# Patient Record
Sex: FEMALE | Race: WHITE | Employment: FULL TIME | URBAN - METROPOLITAN AREA
[De-identification: names, ages, dates, MRNs, and addresses within clinical notes are randomized per-mention and may not be internally consistent; named-entity substitution may affect disease eponyms.]

---

## 2017-07-12 ENCOUNTER — HOSPITAL ENCOUNTER (OUTPATIENT)
Age: 27
Setting detail: SPECIMEN
Discharge: HOME | End: 2017-07-12
Payer: MEDICARE

## 2017-07-12 ENCOUNTER — OFFICE VISIT (OUTPATIENT)
Dept: OBGYN CLINIC | Age: 27
End: 2017-07-12
Payer: MEDICARE

## 2017-07-12 VITALS
DIASTOLIC BLOOD PRESSURE: 69 MMHG | HEART RATE: 68 BPM | WEIGHT: 146 LBS | SYSTOLIC BLOOD PRESSURE: 104 MMHG | BODY MASS INDEX: 24.92 KG/M2 | HEIGHT: 64 IN

## 2017-07-12 DIAGNOSIS — Z01.419 WOMEN'S ANNUAL ROUTINE GYNECOLOGICAL EXAMINATION: Primary | ICD-10-CM

## 2017-07-12 PROCEDURE — 99395 PREV VISIT EST AGE 18-39: CPT | Performed by: ADVANCED PRACTICE MIDWIFE

## 2017-07-12 ASSESSMENT — ENCOUNTER SYMPTOMS
ALLERGIC/IMMUNOLOGIC NEGATIVE: 1
RESPIRATORY NEGATIVE: 1
EYES NEGATIVE: 1
GASTROINTESTINAL NEGATIVE: 1

## 2017-07-17 LAB — CYTOLOGY REPORT: NORMAL

## 2017-11-06 ENCOUNTER — HOSPITAL ENCOUNTER (OUTPATIENT)
Age: 27
Setting detail: SPECIMEN
Discharge: HOME | End: 2017-11-06
Payer: MEDICARE

## 2017-11-06 ENCOUNTER — OFFICE VISIT (OUTPATIENT)
Dept: OBGYN CLINIC | Age: 27
End: 2017-11-06
Payer: MEDICARE

## 2017-11-06 VITALS
BODY MASS INDEX: 23.56 KG/M2 | HEART RATE: 76 BPM | SYSTOLIC BLOOD PRESSURE: 102 MMHG | WEIGHT: 138 LBS | HEIGHT: 64 IN | DIASTOLIC BLOOD PRESSURE: 66 MMHG

## 2017-11-06 DIAGNOSIS — N93.9 ABNORMAL UTERINE BLEEDING (AUB): ICD-10-CM

## 2017-11-06 DIAGNOSIS — N93.9 ABNORMAL UTERINE BLEEDING (AUB): Primary | ICD-10-CM

## 2017-11-06 LAB
ABSOLUTE EOS #: 0.09 K/UL (ref 0–0.44)
ABSOLUTE IMMATURE GRANULOCYTE: <0.03 K/UL (ref 0–0.3)
ABSOLUTE LYMPH #: 1.39 K/UL (ref 1.1–3.7)
ABSOLUTE MONO #: 0.39 K/UL (ref 0.1–1.2)
BASOPHILS # BLD: 1 % (ref 0–2)
BASOPHILS ABSOLUTE: 0.05 K/UL (ref 0–0.2)
DIFFERENTIAL TYPE: ABNORMAL
EOSINOPHILS RELATIVE PERCENT: 2 % (ref 1–4)
HCG QUANTITATIVE: <1 IU/L
HCT VFR BLD CALC: 39.8 % (ref 36.3–47.1)
HEMOGLOBIN: 12.5 G/DL (ref 11.9–15.1)
IMMATURE GRANULOCYTES: 0 %
LYMPHOCYTES # BLD: 23 % (ref 24–43)
MCH RBC QN AUTO: 26.9 PG (ref 25.2–33.5)
MCHC RBC AUTO-ENTMCNC: 31.4 G/DL (ref 28.4–34.8)
MCV RBC AUTO: 85.6 FL (ref 82.6–102.9)
MONOCYTES # BLD: 7 % (ref 3–12)
PDW BLD-RTO: 13 % (ref 11.8–14.4)
PLATELET # BLD: 251 K/UL (ref 138–453)
PLATELET ESTIMATE: ABNORMAL
PMV BLD AUTO: 10.8 FL (ref 8.1–13.5)
RBC # BLD: 4.65 M/UL (ref 3.95–5.11)
RBC # BLD: ABNORMAL 10*6/UL
SEG NEUTROPHILS: 68 % (ref 36–65)
SEGMENTED NEUTROPHILS ABSOLUTE COUNT: 4.03 K/UL (ref 1.5–8.1)
THYROXINE, FREE: 1.29 NG/DL (ref 0.93–1.7)
TSH SERPL DL<=0.05 MIU/L-ACNC: 0.55 MIU/L (ref 0.3–5)
WBC # BLD: 6 K/UL (ref 3.5–11.3)
WBC # BLD: ABNORMAL 10*3/UL

## 2017-11-06 PROCEDURE — G8427 DOCREV CUR MEDS BY ELIG CLIN: HCPCS | Performed by: ADVANCED PRACTICE MIDWIFE

## 2017-11-06 PROCEDURE — G8484 FLU IMMUNIZE NO ADMIN: HCPCS | Performed by: ADVANCED PRACTICE MIDWIFE

## 2017-11-06 PROCEDURE — G8420 CALC BMI NORM PARAMETERS: HCPCS | Performed by: ADVANCED PRACTICE MIDWIFE

## 2017-11-06 PROCEDURE — 99213 OFFICE O/P EST LOW 20 MIN: CPT | Performed by: ADVANCED PRACTICE MIDWIFE

## 2017-11-06 PROCEDURE — 1036F TOBACCO NON-USER: CPT | Performed by: ADVANCED PRACTICE MIDWIFE

## 2017-11-06 RX ORDER — M-VIT,TX,IRON,MINS/CALC/FOLIC 27MG-0.4MG
1 TABLET ORAL DAILY
COMMUNITY
End: 2019-02-06

## 2017-11-06 ASSESSMENT — PATIENT HEALTH QUESTIONNAIRE - PHQ9
1. LITTLE INTEREST OR PLEASURE IN DOING THINGS: 0
SUM OF ALL RESPONSES TO PHQ9 QUESTIONS 1 & 2: 0
SUM OF ALL RESPONSES TO PHQ QUESTIONS 1-9: 0
2. FEELING DOWN, DEPRESSED OR HOPELESS: 0

## 2017-11-06 NOTE — PROGRESS NOTES
SUBJECTIVE:     CC:   Chief Complaint   Patient presents with    Menstrual Problem     periods have been heavy          HPI:   Anthony Philippe is an established patient with our practice and presents today with a complaint of abnormal vaginal bleeding. Pt reports having a spontaneous vaginal delivery in may of 2016 with exclusive breastfeeding and not having a resumption of menses until July of 2017. Pt reports prior to pregnancy having sporadic menses with 7 days in length and the first two days heavy. Reports not having a regular cycle since July and having an increase in menstrual flow. Reports when using tampons having to change it within the hour. Reports changing to menstrual cup which holds 30cc of blood and having to change after an hour and half. Reports is concerned if this is normal. Reports is still breastfeeding. Denies any recent weight gain/loss, no increased acne, or hirsutism. Reports researching chaste berry and wondering safety in lactation. She denies molimina. She denies any recent trauma. She is  sexually active and may be pregnant  She  denies any bleeding disorder in her family history. Associated symptoms: menstrual cramping     Patient's last menstrual period was 11/01/2017. .    . Anthony Philippe states there is   associated pain. She rates the pain 6/10 on the scale. Anthony Philippe denies any recent changes in her health, weight, medications, stressors or exercise level. REVIEW OF SYSTEMS:    1. Constitutional: No fever, chills or malaise. No weight change or fatigue  2. Head and eyes: No headache or trauma. No visual changes or dizziness  3. ENT: No hearing loss, tinnitus, sinus or taste problems. No heat or cold intolerance  4. Hematologic: No lymphoma, Von Willebrand's, Hemophilia or bruising,   5. Psychological: No depression, suicidal thoughts, crying  or anxiety  6. Breast: No skin changes, masses, mastalgia, discharge Currently Breast feeding  7.  Respiratory:  No cough, wheezing, SOB  8. Cardiovascular: No  palpitations, syncope, edema or chest pain  9. Gastrointestinal:  No heartburn, N/V, bloody stools, pain, bloating  10. Genito-urinary: No dysuria, hematuria, dyspareunia, + abnormal bleeding  11. Musculoskeletal: No arthralgia, gout, muscle weakness  12. Neurological: No CVA, migraines, seizures, syncope, numbness  13. Dermatologic: No rashes, itching, hives, acne or hirsutism  14. Lymphatic: No adenopathy    OBJECTIVE:    PHYSICAL EXAM:  Constitutional:   Blood pressure 102/66, pulse 76, height 5' 4\" (1.626 m), weight 138 lb (62.6 kg), last menstrual period 11/01/2017, currently breastfeeding. General Appearance: This is a well-developed, well-nourished and well-groomed female    Skin:  Inspection of the skin revealed no rashes or lesions    Neck and EENT:  The neck was supple with full range of motion and no masses. The thyroid was not enlarged and had no masses. Normal external ears are present  Nares are patent    Respiratory: The lungs were clear to auscultation bilaterally. There were no rales, rhonchi or wheezes. There was good respiratory effort. Cardiovascular: The heart was in a regular rhythm and rate was normal.  No murmur or extra sounds were noted. Breast:  deferred    Back:  Straight with no CVA tenderness present    Abdomen: The abdomen is soft and non-tender. There was no guarding, rebound or rigidity. The bladder was without fullness or tenderness. No hernias were appreciated. Pelvic:  deferred    Psychiatric:  Alert, oriented to time, place, person and situation. There are no mood or affect changes. ASSESSMENT:  Abnormal Uterine Bleeding-N      PLAN;  1.  Labs/Radiology:   Orders Placed This Encounter   Procedures    US Non OB Transvaginal    US Pelvis Complete    CBC Auto Differential    HCG, Quantitative, Pregnancy    TSH without Reflex    T4, Free   . Patient was seen with total face to face time of 15 minutes.  More than 50% of this visit was on counseling and education regarding her   1. Abnormal uterine bleeding (AUB)  CBC Auto Differential    US Non OB Transvaginal    US Pelvis Complete    HCG, Quantitative, Pregnancy    TSH without Reflex    T4, Free    and her options. She was also counseled on her preventative health maintenance recommendations and needed follow-up.

## 2019-01-16 ENCOUNTER — TELEPHONE (OUTPATIENT)
Dept: OBGYN CLINIC | Age: 29
End: 2019-01-16

## 2019-02-06 ENCOUNTER — OFFICE VISIT (OUTPATIENT)
Dept: OBGYN CLINIC | Age: 29
End: 2019-02-06
Payer: MEDICARE

## 2019-02-06 VITALS
SYSTOLIC BLOOD PRESSURE: 118 MMHG | OXYGEN SATURATION: 98 % | BODY MASS INDEX: 25.28 KG/M2 | HEART RATE: 98 BPM | HEIGHT: 64 IN | DIASTOLIC BLOOD PRESSURE: 73 MMHG | WEIGHT: 148.06 LBS

## 2019-02-06 DIAGNOSIS — N91.2 AMENORRHEA: Primary | ICD-10-CM

## 2019-02-06 DIAGNOSIS — Z13.79 GENETIC TESTING: ICD-10-CM

## 2019-02-06 LAB
CONTROL: PRESENT
PREGNANCY TEST URINE, POC: POSITIVE

## 2019-02-06 PROCEDURE — 99213 OFFICE O/P EST LOW 20 MIN: CPT | Performed by: ADVANCED PRACTICE MIDWIFE

## 2019-02-06 PROCEDURE — G8427 DOCREV CUR MEDS BY ELIG CLIN: HCPCS | Performed by: ADVANCED PRACTICE MIDWIFE

## 2019-02-06 PROCEDURE — 1036F TOBACCO NON-USER: CPT | Performed by: ADVANCED PRACTICE MIDWIFE

## 2019-02-06 PROCEDURE — G8484 FLU IMMUNIZE NO ADMIN: HCPCS | Performed by: ADVANCED PRACTICE MIDWIFE

## 2019-02-06 PROCEDURE — 81025 URINE PREGNANCY TEST: CPT | Performed by: ADVANCED PRACTICE MIDWIFE

## 2019-02-06 PROCEDURE — G8419 CALC BMI OUT NRM PARAM NOF/U: HCPCS | Performed by: ADVANCED PRACTICE MIDWIFE

## 2019-02-06 RX ORDER — PNV NO.95/FERROUS FUM/FOLIC AC 28MG-0.8MG
325 TABLET ORAL DAILY
COMMUNITY
Start: 2016-05-05

## 2019-02-06 RX ORDER — VITAMIN B COMPLEX
1 CAPSULE ORAL DAILY
COMMUNITY
End: 2019-02-06

## 2019-02-20 ENCOUNTER — HOSPITAL ENCOUNTER (OUTPATIENT)
Age: 29
Setting detail: SPECIMEN
Discharge: HOME | End: 2019-02-20
Payer: MEDICARE

## 2019-02-20 ENCOUNTER — INITIAL PRENATAL (OUTPATIENT)
Dept: OBGYN CLINIC | Age: 29
End: 2019-02-20
Payer: MEDICARE

## 2019-02-20 VITALS
HEART RATE: 91 BPM | DIASTOLIC BLOOD PRESSURE: 73 MMHG | WEIGHT: 152.4 LBS | SYSTOLIC BLOOD PRESSURE: 125 MMHG | BODY MASS INDEX: 26.16 KG/M2

## 2019-02-20 DIAGNOSIS — Z34.91 PRENATAL CARE IN FIRST TRIMESTER: ICD-10-CM

## 2019-02-20 DIAGNOSIS — Z34.90 NORMAL PREGNANCY, ANTEPARTUM: ICD-10-CM

## 2019-02-20 DIAGNOSIS — Z28.21 REFUSED INFLUENZA VACCINE: ICD-10-CM

## 2019-02-20 DIAGNOSIS — Z34.91 PRENATAL CARE IN FIRST TRIMESTER: Primary | ICD-10-CM

## 2019-02-20 PROBLEM — N93.9 ABNORMAL UTERINE BLEEDING (AUB): Status: RESOLVED | Noted: 2017-11-06 | Resolved: 2019-02-20

## 2019-02-20 LAB
AMPHETAMINE SCREEN URINE: NEGATIVE
BARBITURATE SCREEN URINE: NEGATIVE
BENZODIAZEPINE SCREEN, URINE: NEGATIVE
BUPRENORPHINE URINE: NORMAL
CANNABINOID SCREEN URINE: NEGATIVE
COCAINE METABOLITE, URINE: NEGATIVE
MDMA URINE: NORMAL
METHADONE SCREEN, URINE: NEGATIVE
METHAMPHETAMINE, URINE: NORMAL
OPIATES, URINE: NEGATIVE
OXYCODONE SCREEN URINE: NEGATIVE
PHENCYCLIDINE, URINE: NEGATIVE
PROPOXYPHENE, URINE: NORMAL
TEST INFORMATION: NORMAL
TRICYCLIC ANTIDEPRESSANTS, UR: NORMAL

## 2019-02-20 PROCEDURE — G8427 DOCREV CUR MEDS BY ELIG CLIN: HCPCS | Performed by: ADVANCED PRACTICE MIDWIFE

## 2019-02-20 PROCEDURE — 99214 OFFICE O/P EST MOD 30 MIN: CPT | Performed by: ADVANCED PRACTICE MIDWIFE

## 2019-02-20 PROCEDURE — G8419 CALC BMI OUT NRM PARAM NOF/U: HCPCS | Performed by: ADVANCED PRACTICE MIDWIFE

## 2019-02-20 PROCEDURE — G8484 FLU IMMUNIZE NO ADMIN: HCPCS | Performed by: ADVANCED PRACTICE MIDWIFE

## 2019-02-20 PROCEDURE — 1036F TOBACCO NON-USER: CPT | Performed by: ADVANCED PRACTICE MIDWIFE

## 2019-02-21 LAB
C. TRACHOMATIS DNA ,URINE: NEGATIVE
N. GONORRHOEAE DNA, URINE: NEGATIVE
SPECIMEN DESCRIPTION: NORMAL

## 2019-02-22 LAB
CULTURE: NO GROWTH
Lab: NORMAL
SPECIMEN DESCRIPTION: NORMAL

## 2019-03-08 ENCOUNTER — ROUTINE PRENATAL (OUTPATIENT)
Dept: PERINATAL CARE | Age: 29
End: 2019-03-08
Payer: MEDICARE

## 2019-03-08 VITALS
HEIGHT: 64 IN | TEMPERATURE: 98.6 F | SYSTOLIC BLOOD PRESSURE: 128 MMHG | BODY MASS INDEX: 25.95 KG/M2 | DIASTOLIC BLOOD PRESSURE: 84 MMHG | WEIGHT: 152 LBS | HEART RATE: 100 BPM | RESPIRATION RATE: 16 BRPM

## 2019-03-08 DIAGNOSIS — Z36.9 FIRST TRIMESTER SCREENING: Primary | ICD-10-CM

## 2019-03-08 DIAGNOSIS — Z3A.12 12 WEEKS GESTATION OF PREGNANCY: ICD-10-CM

## 2019-03-08 DIAGNOSIS — O36.80X0 ENCOUNTER TO DETERMINE FETAL VIABILITY OF PREGNANCY, SINGLE OR UNSPECIFIED FETUS: ICD-10-CM

## 2019-03-08 PROCEDURE — 76813 OB US NUCHAL MEAS 1 GEST: CPT | Performed by: OBSTETRICS & GYNECOLOGY

## 2019-03-08 PROCEDURE — 76801 OB US < 14 WKS SINGLE FETUS: CPT | Performed by: OBSTETRICS & GYNECOLOGY

## 2019-03-20 ENCOUNTER — ROUTINE PRENATAL (OUTPATIENT)
Dept: OBGYN CLINIC | Age: 29
End: 2019-03-20
Payer: MEDICARE

## 2019-03-20 ENCOUNTER — HOSPITAL ENCOUNTER (OUTPATIENT)
Age: 29
Setting detail: SPECIMEN
Discharge: HOME | End: 2019-03-20
Payer: MEDICARE

## 2019-03-20 VITALS
BODY MASS INDEX: 26.1 KG/M2 | HEART RATE: 95 BPM | DIASTOLIC BLOOD PRESSURE: 69 MMHG | SYSTOLIC BLOOD PRESSURE: 110 MMHG | WEIGHT: 152.06 LBS

## 2019-03-20 DIAGNOSIS — Z3A.13 13 WEEKS GESTATION OF PREGNANCY: ICD-10-CM

## 2019-03-20 DIAGNOSIS — Z34.91 PRENATAL CARE IN FIRST TRIMESTER: ICD-10-CM

## 2019-03-20 DIAGNOSIS — Z34.90 NORMAL PREGNANCY, ANTEPARTUM: Primary | ICD-10-CM

## 2019-03-20 DIAGNOSIS — Z34.90 NORMAL PREGNANCY, ANTEPARTUM: ICD-10-CM

## 2019-03-20 PROBLEM — Z86.59 HISTORY OF POSTPARTUM DEPRESSION: Status: ACTIVE | Noted: 2019-03-20

## 2019-03-20 PROBLEM — Z87.59 HISTORY OF POSTPARTUM DEPRESSION: Status: ACTIVE | Noted: 2019-03-20

## 2019-03-20 PROBLEM — Z87.59 HISTORY OF PRECIPITOUS DELIVERY: Status: ACTIVE | Noted: 2019-03-20

## 2019-03-20 LAB
ABO/RH: NORMAL
ABSOLUTE EOS #: 0.11 K/UL (ref 0–0.44)
ABSOLUTE IMMATURE GRANULOCYTE: <0.03 K/UL (ref 0–0.3)
ABSOLUTE LYMPH #: 1.54 K/UL (ref 1.1–3.7)
ABSOLUTE MONO #: 0.6 K/UL (ref 0.1–1.2)
ANTIBODY SCREEN: NEGATIVE
BASOPHILS # BLD: 0 % (ref 0–2)
BASOPHILS ABSOLUTE: <0.03 K/UL (ref 0–0.2)
DIFFERENTIAL TYPE: ABNORMAL
EOSINOPHILS RELATIVE PERCENT: 1 % (ref 1–4)
HCT VFR BLD CALC: 38.1 % (ref 36.3–47.1)
HEMOGLOBIN: 12.9 G/DL (ref 11.9–15.1)
HEPATITIS B SURFACE ANTIGEN: NONREACTIVE
HIV AG/AB: NONREACTIVE
IMMATURE GRANULOCYTES: 0 %
LYMPHOCYTES # BLD: 18 % (ref 24–43)
MCH RBC QN AUTO: 28.8 PG (ref 25.2–33.5)
MCHC RBC AUTO-ENTMCNC: 33.9 G/DL (ref 28.4–34.8)
MCV RBC AUTO: 85 FL (ref 82.6–102.9)
MONOCYTES # BLD: 7 % (ref 3–12)
NRBC AUTOMATED: 0 PER 100 WBC
PDW BLD-RTO: 12.8 % (ref 11.8–14.4)
PLATELET # BLD: 189 K/UL (ref 138–453)
PLATELET ESTIMATE: ABNORMAL
PMV BLD AUTO: 10.8 FL (ref 8.1–13.5)
RBC # BLD: 4.48 M/UL (ref 3.95–5.11)
RBC # BLD: ABNORMAL 10*6/UL
RUBV IGG SER QL: 363.3 IU/ML
SEG NEUTROPHILS: 74 % (ref 36–65)
SEGMENTED NEUTROPHILS ABSOLUTE COUNT: 6.11 K/UL (ref 1.5–8.1)
T. PALLIDUM, IGG: NONREACTIVE
WBC # BLD: 8.4 K/UL (ref 3.5–11.3)
WBC # BLD: ABNORMAL 10*3/UL

## 2019-03-20 PROCEDURE — G8419 CALC BMI OUT NRM PARAM NOF/U: HCPCS | Performed by: ADVANCED PRACTICE MIDWIFE

## 2019-03-20 PROCEDURE — G8427 DOCREV CUR MEDS BY ELIG CLIN: HCPCS | Performed by: ADVANCED PRACTICE MIDWIFE

## 2019-03-20 PROCEDURE — 1036F TOBACCO NON-USER: CPT | Performed by: ADVANCED PRACTICE MIDWIFE

## 2019-03-20 PROCEDURE — 99213 OFFICE O/P EST LOW 20 MIN: CPT | Performed by: ADVANCED PRACTICE MIDWIFE

## 2019-03-20 PROCEDURE — G8484 FLU IMMUNIZE NO ADMIN: HCPCS | Performed by: ADVANCED PRACTICE MIDWIFE

## 2019-03-20 RX ORDER — VITAMIN B COMPLEX
1 CAPSULE ORAL DAILY
COMMUNITY

## 2019-04-16 ENCOUNTER — ROUTINE PRENATAL (OUTPATIENT)
Dept: OBGYN CLINIC | Age: 29
End: 2019-04-16
Payer: MEDICARE

## 2019-04-16 VITALS
BODY MASS INDEX: 26.26 KG/M2 | HEART RATE: 83 BPM | SYSTOLIC BLOOD PRESSURE: 115 MMHG | WEIGHT: 153 LBS | DIASTOLIC BLOOD PRESSURE: 69 MMHG

## 2019-04-16 DIAGNOSIS — Z34.90 NORMAL PREGNANCY, ANTEPARTUM: Primary | ICD-10-CM

## 2019-04-16 DIAGNOSIS — Z3A.17 17 WEEKS GESTATION OF PREGNANCY: ICD-10-CM

## 2019-04-16 PROCEDURE — 99212 OFFICE O/P EST SF 10 MIN: CPT | Performed by: ADVANCED PRACTICE MIDWIFE

## 2019-04-16 NOTE — PROGRESS NOTES
SUBJECTIVE:    Casey Avendano is here for her return OB visit. Doing well no concerns. She reports  feeling fetal movement. She denies vaginal bleeding. She denies vaginal discharge. She denies leaking of fluid. She denies uterine cramping. She denies  nausea and/or vomiting. OBJECTIVE:  Blood pressure 115/69, pulse 83, weight 153 lb (69.4 kg), last menstrual period 12/13/2018, currently breastfeeding. Total weight gain: 5 lb (2.268 kg)      ASSESSMENT/PLAN:  IUP @ 17+5 weeks  S =D    1. Normal pregnancy, antepartum  2. 17 weeks gestation of pregnancy  -Reviewed prenatal labs WNL  -First trimester screen WNL  -Has anatomy scan scheduled  -Reviewed fetal movement  -Glucola between 24-28 weeks      She was counseled regarding all of the above:    Return in about 1 month (around 5/14/2019). Patient was seen with total face to face time of 15 minutes. More than 50% of this visit was education and counseling.     Electronically Signed By: Bouchra Avila

## 2019-05-06 ENCOUNTER — ROUTINE PRENATAL (OUTPATIENT)
Dept: PERINATAL CARE | Age: 29
End: 2019-05-06
Payer: MEDICARE

## 2019-05-06 VITALS
DIASTOLIC BLOOD PRESSURE: 70 MMHG | WEIGHT: 162 LBS | RESPIRATION RATE: 16 BRPM | TEMPERATURE: 98.6 F | BODY MASS INDEX: 27.66 KG/M2 | HEART RATE: 88 BPM | SYSTOLIC BLOOD PRESSURE: 108 MMHG | HEIGHT: 64 IN

## 2019-05-06 DIAGNOSIS — Z36.86 ENCOUNTER FOR SCREENING FOR RISK OF PRE-TERM LABOR: ICD-10-CM

## 2019-05-06 DIAGNOSIS — Z3A.20 20 WEEKS GESTATION OF PREGNANCY: ICD-10-CM

## 2019-05-06 DIAGNOSIS — Z13.89 ENCOUNTER FOR ROUTINE SCREENING FOR MALFORMATION USING ULTRASONICS: Primary | ICD-10-CM

## 2019-05-06 PROCEDURE — 76805 OB US >/= 14 WKS SNGL FETUS: CPT | Performed by: OBSTETRICS & GYNECOLOGY

## 2019-05-06 PROCEDURE — 76817 TRANSVAGINAL US OBSTETRIC: CPT | Performed by: OBSTETRICS & GYNECOLOGY

## 2019-05-09 ENCOUNTER — HOSPITAL ENCOUNTER (OUTPATIENT)
Age: 29
Setting detail: SPECIMEN
Discharge: HOME | End: 2019-05-09
Payer: MEDICARE

## 2019-05-14 LAB
AFP INTERPRETATION: NORMAL
AFP MOM: 0.97
AFP SPECIMEN: NORMAL
AFP: 61 NG/ML
DATE OF BIRTH: NORMAL
DATING METHOD: NORMAL
DETERMINED BY: NORMAL
DIABETIC: NO
DUE DATE: NORMAL
ESTIMATED DUE DATE: NORMAL
FAMILY HISTORY NTD: NO
GESTATIONAL AGE: NORMAL
INSULIN REQ DIABETES: NO
LAST MENSTRUAL PERIOD: NORMAL
MATERNAL AGE AT EDD: 30.7 YR
MATERNAL WEIGHT: 162
MONOCHORIONIC TWINS: NORMAL
NUMBER OF FETUSES: NORMAL
PATIENT WEIGHT UNITS: NORMAL
PATIENT WEIGHT: NORMAL
RACE (MATERNAL): NORMAL
RACE: NORMAL
REPEAT SPECIMEN?: NO
SMOKING: NORMAL
SMOKING: NORMAL
VALPROIC/CARBAMAZEP: NORMAL
ZZ NTE CLEAN UP: HISTORY: NO

## 2019-05-15 ENCOUNTER — ROUTINE PRENATAL (OUTPATIENT)
Dept: OBGYN CLINIC | Age: 29
End: 2019-05-15
Payer: MEDICARE

## 2019-05-15 VITALS
SYSTOLIC BLOOD PRESSURE: 117 MMHG | HEART RATE: 102 BPM | WEIGHT: 158.5 LBS | DIASTOLIC BLOOD PRESSURE: 64 MMHG | BODY MASS INDEX: 27.21 KG/M2

## 2019-05-15 DIAGNOSIS — Z34.90 NORMAL PREGNANCY, ANTEPARTUM: Primary | ICD-10-CM

## 2019-05-15 DIAGNOSIS — Z3A.21 21 WEEKS GESTATION OF PREGNANCY: ICD-10-CM

## 2019-05-15 PROCEDURE — 1036F TOBACCO NON-USER: CPT | Performed by: ADVANCED PRACTICE MIDWIFE

## 2019-05-15 PROCEDURE — 99213 OFFICE O/P EST LOW 20 MIN: CPT | Performed by: ADVANCED PRACTICE MIDWIFE

## 2019-05-15 PROCEDURE — G8419 CALC BMI OUT NRM PARAM NOF/U: HCPCS | Performed by: ADVANCED PRACTICE MIDWIFE

## 2019-05-15 PROCEDURE — G8427 DOCREV CUR MEDS BY ELIG CLIN: HCPCS | Performed by: ADVANCED PRACTICE MIDWIFE

## 2019-05-15 NOTE — PROGRESS NOTES
SUBJECTIVE:  Harley Lynn is here for her return OB visit. Overall, doing well with no complaints for today's visit  She reports fetal movement. She denies vaginal bleeding. She denies vaginal discharge. She denies leaking of fluid. She denies uterine contraction activity. She denies nausea and/or vomiting. She denies retaining fluid in her extremities. OBJECTIVE:  Last menstrual period 12/13/2018, currently breastfeeding. ASSESSMENT/PLAN:  IUP @ 21.6 weeks  S = D    The problem list was reviewed and updated as needed with any new issues today    Harley Lynn will begin to monitor fetal movement daily    sAFP results were discussed. BMI and appropriate weight gain recommendations were discussed. Knows she is up 10 lb more than stated starting weight and she will monitor intake  Normal: BMI < 25: Gain 25-35 lb  Discussed glucola and will obtain Felicia Andrews was counseled regarding all of the above      Patient was seen with total face to face time of 15 minutes. More than 50% of this  visit was for counseling and education.

## 2019-05-16 ENCOUNTER — TELEPHONE (OUTPATIENT)
Dept: OBGYN CLINIC | Age: 29
End: 2019-05-16

## 2019-05-17 DIAGNOSIS — Z3A.21 21 WEEKS GESTATION OF PREGNANCY: Primary | ICD-10-CM

## 2019-06-13 ENCOUNTER — ROUTINE PRENATAL (OUTPATIENT)
Dept: OBGYN CLINIC | Age: 29
End: 2019-06-13
Payer: MEDICARE

## 2019-06-13 ENCOUNTER — HOSPITAL ENCOUNTER (OUTPATIENT)
Age: 29
Setting detail: SPECIMEN
Discharge: HOME | End: 2019-06-13
Payer: MEDICARE

## 2019-06-13 VITALS
BODY MASS INDEX: 27.6 KG/M2 | DIASTOLIC BLOOD PRESSURE: 63 MMHG | WEIGHT: 160.8 LBS | HEART RATE: 88 BPM | SYSTOLIC BLOOD PRESSURE: 110 MMHG

## 2019-06-13 DIAGNOSIS — Z34.90 NORMAL PREGNANCY, ANTEPARTUM: Primary | ICD-10-CM

## 2019-06-13 DIAGNOSIS — Z3A.26 26 WEEKS GESTATION OF PREGNANCY: Primary | ICD-10-CM

## 2019-06-13 DIAGNOSIS — Z3A.26 26 WEEKS GESTATION OF PREGNANCY: ICD-10-CM

## 2019-06-13 LAB
ABSOLUTE EOS #: 0.08 K/UL (ref 0–0.44)
ABSOLUTE IMMATURE GRANULOCYTE: 0.03 K/UL (ref 0–0.3)
ABSOLUTE LYMPH #: 1.1 K/UL (ref 1.1–3.7)
ABSOLUTE MONO #: 0.47 K/UL (ref 0.1–1.2)
BASOPHILS # BLD: 0 % (ref 0–2)
BASOPHILS ABSOLUTE: <0.03 K/UL (ref 0–0.2)
DIFFERENTIAL TYPE: ABNORMAL
EOSINOPHILS RELATIVE PERCENT: 1 % (ref 1–4)
GLUCOSE ADMINISTRATION: NORMAL
GLUCOSE TOLERANCE SCREEN 50G: 129 MG/DL (ref 70–135)
HCT VFR BLD CALC: 35.4 % (ref 36.3–47.1)
HEMOGLOBIN: 10.4 G/DL (ref 11.9–15.1)
IMMATURE GRANULOCYTES: 0 %
LYMPHOCYTES # BLD: 15 % (ref 24–43)
MCH RBC QN AUTO: 26.1 PG (ref 25.2–33.5)
MCHC RBC AUTO-ENTMCNC: 29.4 G/DL (ref 28.4–34.8)
MCV RBC AUTO: 88.7 FL (ref 82.6–102.9)
MONOCYTES # BLD: 7 % (ref 3–12)
NRBC AUTOMATED: 0 PER 100 WBC
PDW BLD-RTO: 13.2 % (ref 11.8–14.4)
PLATELET # BLD: 179 K/UL (ref 138–453)
PLATELET ESTIMATE: ABNORMAL
PMV BLD AUTO: 11.5 FL (ref 8.1–13.5)
RBC # BLD: 3.99 M/UL (ref 3.95–5.11)
RBC # BLD: ABNORMAL 10*6/UL
SEG NEUTROPHILS: 77 % (ref 36–65)
SEGMENTED NEUTROPHILS ABSOLUTE COUNT: 5.42 K/UL (ref 1.5–8.1)
WBC # BLD: 7.1 K/UL (ref 3.5–11.3)
WBC # BLD: ABNORMAL 10*3/UL

## 2019-06-13 PROCEDURE — G8419 CALC BMI OUT NRM PARAM NOF/U: HCPCS | Performed by: ADVANCED PRACTICE MIDWIFE

## 2019-06-13 PROCEDURE — 99213 OFFICE O/P EST LOW 20 MIN: CPT | Performed by: ADVANCED PRACTICE MIDWIFE

## 2019-06-13 PROCEDURE — G8427 DOCREV CUR MEDS BY ELIG CLIN: HCPCS | Performed by: ADVANCED PRACTICE MIDWIFE

## 2019-06-13 PROCEDURE — 1036F TOBACCO NON-USER: CPT | Performed by: ADVANCED PRACTICE MIDWIFE

## 2019-06-13 NOTE — PROGRESS NOTES
SUBJECTIVE:    Meredith Ruelas is here for her return OB visit. No concerns. Doing glucola. She reports  feeling fetal movement. She denies vaginal bleeding. She denies vaginal discharge. She denies leaking of fluid. She denies uterine cramping. She denies  nausea and/or vomiting. OBJECTIVE:  Blood pressure 110/63, pulse 88, weight 160 lb 12.8 oz (72.9 kg), last menstrual period 12/13/2018, currently breastfeeding. Total weight gain: 12 lb 12.8 oz (5.806 kg)        ASSESSMENT/PLAN:  IUP @ 26+0 weeks  S =D    1. Normal pregnancy, antepartum    2. 26 weeks gestation of pregnancy  - Reviewed fetal movement  - Reviewed u/s f/up as clinically indicated  - Labs done today  - Reviewed warning signs of second trimester         She was counseled regarding all of the above:    Return in about 1 month (around 7/11/2019) for Return OB. Patient was seen with total face to face time of 15 minutes. More than 50% of this visit was education and counseling.     Electronically Signed By: Bouchra Best

## 2019-06-14 ENCOUNTER — OFFICE VISIT (OUTPATIENT)
Dept: PRIMARY CARE CLINIC | Age: 29
End: 2019-06-14
Payer: MEDICARE

## 2019-06-14 VITALS
DIASTOLIC BLOOD PRESSURE: 70 MMHG | HEART RATE: 80 BPM | SYSTOLIC BLOOD PRESSURE: 116 MMHG | BODY MASS INDEX: 28 KG/M2 | RESPIRATION RATE: 18 BRPM | HEIGHT: 64 IN | WEIGHT: 164 LBS

## 2019-06-14 DIAGNOSIS — Z00.00 WELL ADULT EXAM: Primary | ICD-10-CM

## 2019-06-14 DIAGNOSIS — Z34.90 NORMAL PREGNANCY, ANTEPARTUM: ICD-10-CM

## 2019-06-14 DIAGNOSIS — R79.89 HOMOCYSTEINE LEVEL ABOVE REFERENCE RANGE: ICD-10-CM

## 2019-06-14 PROCEDURE — 99204 OFFICE O/P NEW MOD 45 MIN: CPT | Performed by: NURSE PRACTITIONER

## 2019-06-14 PROCEDURE — G8419 CALC BMI OUT NRM PARAM NOF/U: HCPCS | Performed by: NURSE PRACTITIONER

## 2019-06-14 PROCEDURE — G8427 DOCREV CUR MEDS BY ELIG CLIN: HCPCS | Performed by: NURSE PRACTITIONER

## 2019-06-14 PROCEDURE — 1036F TOBACCO NON-USER: CPT | Performed by: NURSE PRACTITIONER

## 2019-06-14 ASSESSMENT — ENCOUNTER SYMPTOMS
ABDOMINAL PAIN: 0
DIARRHEA: 0
WHEEZING: 0
TROUBLE SWALLOWING: 0
SORE THROAT: 0
VOMITING: 0
NAUSEA: 0
SINUS PRESSURE: 0
CONSTIPATION: 0
SHORTNESS OF BREATH: 0
BLOOD IN STOOL: 0
COUGH: 0

## 2019-06-14 ASSESSMENT — PATIENT HEALTH QUESTIONNAIRE - PHQ9
2. FEELING DOWN, DEPRESSED OR HOPELESS: 0
SUM OF ALL RESPONSES TO PHQ QUESTIONS 1-9: 0
SUM OF ALL RESPONSES TO PHQ QUESTIONS 1-9: 0
SUM OF ALL RESPONSES TO PHQ9 QUESTIONS 1 & 2: 0
1. LITTLE INTEREST OR PLEASURE IN DOING THINGS: 0

## 2019-06-14 NOTE — PROGRESS NOTES
[address] PRIMARY CARE  [address] [place] [address]   [address] [facility] [address]  [address] [facility] [address] [address]  Dept: [phone]  Dept Fax: [phone]    Maryam Salazar is a 25 y.o. female who presentstoday for her medical conditions/complaints as noted below.   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 degree in social work  She will be finished in December  Denies any physical concerns, seeing her OBGYN regularly  Tries to follow healthy diet  Family history significant for elevated homocysteine levels and CV disease  Reprots that her level is also elevated  Had postpartum depression with her first child but her other 2 postpartum experiences have been normal  Has never been on medication for depression  Otherwise no significant history      No results found for: LABA1C          ( goal A1C is < 7)   No results found for: LABMICR  No results found for: LDLCHOLESTEROL, LDLCALC    (goal LDL is <100)   BUN (mg/dL)   Date Value   2014 13     BP Readings from Last 3 Encounters:   19 116/70   19 110/63   05/15/19 117/64          ([id number])    Past Medical History:   Diagnosis Date    Constipation     and diarrhea - since first delivery    Elective  ,     Elevated homocysteine ([address] 2009    Fracture phalanges, hand age 19    Fx navicular, foot-closed age 13    Hemorrhoids     after delivery #1    Hx of postpartum depression, currently pregnant     Hx of seasonal allergies     previously taking Nasonex      Past Surgical History:   Procedure Laterality Date    INDUCED   ,     WISDOM TOOTH EXTRACTION         Family History   Problem Relation Age of Onset    Heart Disease Maternal Grandmother     Stroke Maternal Grandmother     Hypertension Maternal Grandmother     Stroke Maternal Grandfather     Heart Attack Maternal Grandfather     Hypertension Maternal Grandfather     Alcohol Abuse Father     Depression Father     Migraines Mother     Other Mother         elevated homocysteine level    Heart Disease Maternal Aunt     Hypertension Maternal Aunt     Heart Disease Maternal Uncle     Hypertension Maternal Uncle           Social History     Tobacco Use    Smoking status: Former Smoker     Packs/day: 0.25     Years: 3.00     Pack years: 0.75     Last attempt to quit: 1/1/2009     Years since quitting: 10.4    Smokeless tobacco: Never Used   Substance Use Topics    Alcohol use: No     Alcohol/week: 0.0 oz      Current Outpatient Medications   Medication Sig Dispense Refill    Misc. Devices MISC DISPENSE (1) PREGNANCY SUPPORT BAND 1 Device 0    MAGNESIUM PO Take 325 mg by mouth daily      b complex vitamins capsule Take 1 capsule by mouth daily      Ferrous Sulfate (IRON) 325 (65 Fe) MG TABS Take 325 mg by mouth daily      PRENAT VIT-FE FUM-FA-FISH OIL PO Take 1 tablet by mouth daily      vitamin D (CHOLECALCIFEROL) 1000 UNIT TABS tablet Take 1 tablet by mouth daily       No current facility-administered medications for this visit. Allergies   Allergen Reactions    No Known Allergies        Health Maintenance   Topic Date Due    Varicella Vaccine (1 of 2 - 13+ 2-dose series) 12/20/2001    Flu vaccine (Season Ended) 09/01/2019    Cervical cancer screen  07/12/2020    DTaP/Tdap/Td vaccine (2 - Td) 05/05/2026    HIV screen  Completed    Pneumococcal 0-64 years Vaccine  Aged Out       Subjective:      Review of Systems   Constitutional: Negative for activity change, appetite change, chills, fatigue, fever and unexpected weight change. HENT: Negative for congestion, ear pain, hearing loss, sinus pressure, sore throat and trouble swallowing. Eyes: Negative for visual disturbance.    Respiratory: Negative for cough, shortness of breath and wheezing. Cardiovascular: Negative for chest pain, palpitations and leg swelling. Gastrointestinal: Negative for abdominal pain, blood in stool, constipation, diarrhea, nausea and vomiting. Endocrine: Negative for cold intolerance, heat intolerance, polydipsia, polyphagia and polyuria. Genitourinary: Negative for difficulty urinating, frequency, hematuria and urgency. Musculoskeletal: Negative for arthralgias and myalgias. Skin: Negative for rash. Allergic/Immunologic: Negative for environmental allergies. Neurological: Negative for dizziness, weakness, light-headedness and headaches. Psychiatric/Behavioral: Negative for confusion. The patient is not nervous/anxious. Objective:     Physical Exam   Constitutional: She is oriented to person, place, and time. She appears well-developed and well-nourished. HENT:   Head: Normocephalic. Eyes: Pupils are equal, round, and reactive to light. Conjunctivae and EOM are normal.   Neck: Normal range of motion. Cardiovascular: Normal rate, regular rhythm, normal heart sounds and intact distal pulses. No murmur heard. Pulmonary/Chest: Effort normal and breath sounds normal. She has no wheezes. Abdominal: Soft. Bowel sounds are normal. She exhibits no distension. Musculoskeletal: Normal range of motion. Neurological: She is alert and oriented to person, place, and time. Skin: Skin is warm and dry. Psychiatric: She has a normal mood and affect. Her behavior is normal. Judgment and thought content normal.     /70 (Site: Left Upper Arm, Position: Sitting, Cuff Size: Medium Adult)   Pulse 80   Resp 18   Ht 5' 4\" (1.626 m)   Wt 164 lb (74.4 kg)   LMP 12/13/2018 (Exact Date)   BMI 28.15 kg/m²     Assessment:       Diagnosis Orders   1. Well adult exam     2. Homocysteine level above reference range     3.  Normal pregnancy, antepartum               Plan:      Return in about 1 year (around 6/14/2020). Established care as new patient, reviewed healthy diet choices and benefits of regular exercise  Elevated homocysteine-discussed risk factors for CV disease, will check lipid panel after baby is born, discussed use of statins pending results  Pregnant-healthy pregnancy thus far, 6 months and due in September, continue regular follow up with OBGYN  Of the 40 minute duration appointment visit, HUSSEIN Duff spent at least 50% of the face-to-face time in counseling, explanation of diagnosis, planning of further management, and answering all questions. Patient given educational materials - see patient instructions. Discussed use, benefit, and side effects of prescribed medications. All patientquestions answered. Pt voiced understanding. Reviewed health maintenance. Instructedto continue current medications, diet and exercise. Patient agreed with treatmentplan. Follow up as directed.      Electronicallysigned by LEOLA Neal CNP on 6/14/2019 at 11:22 AM

## 2019-07-11 ENCOUNTER — ROUTINE PRENATAL (OUTPATIENT)
Dept: OBGYN CLINIC | Age: 29
End: 2019-07-11
Payer: MEDICARE

## 2019-07-11 VITALS
WEIGHT: 165.1 LBS | HEART RATE: 84 BPM | SYSTOLIC BLOOD PRESSURE: 112 MMHG | BODY MASS INDEX: 28.34 KG/M2 | DIASTOLIC BLOOD PRESSURE: 67 MMHG

## 2019-07-11 DIAGNOSIS — Z3A.30 30 WEEKS GESTATION OF PREGNANCY: ICD-10-CM

## 2019-07-11 DIAGNOSIS — Z34.90 NORMAL PREGNANCY, ANTEPARTUM: Primary | ICD-10-CM

## 2019-07-11 PROCEDURE — G8419 CALC BMI OUT NRM PARAM NOF/U: HCPCS | Performed by: ADVANCED PRACTICE MIDWIFE

## 2019-07-11 PROCEDURE — 1036F TOBACCO NON-USER: CPT | Performed by: ADVANCED PRACTICE MIDWIFE

## 2019-07-11 PROCEDURE — 99213 OFFICE O/P EST LOW 20 MIN: CPT | Performed by: ADVANCED PRACTICE MIDWIFE

## 2019-07-11 PROCEDURE — G8427 DOCREV CUR MEDS BY ELIG CLIN: HCPCS | Performed by: ADVANCED PRACTICE MIDWIFE

## 2019-07-25 ENCOUNTER — ROUTINE PRENATAL (OUTPATIENT)
Dept: OBGYN CLINIC | Age: 29
End: 2019-07-25
Payer: MEDICARE

## 2019-07-25 VITALS
DIASTOLIC BLOOD PRESSURE: 73 MMHG | SYSTOLIC BLOOD PRESSURE: 115 MMHG | WEIGHT: 166.44 LBS | HEART RATE: 113 BPM | BODY MASS INDEX: 28.57 KG/M2

## 2019-07-25 DIAGNOSIS — Z34.90 NORMAL PREGNANCY, ANTEPARTUM: ICD-10-CM

## 2019-07-25 DIAGNOSIS — Z23 NEED FOR TDAP VACCINATION: ICD-10-CM

## 2019-07-25 DIAGNOSIS — Z3A.32 32 WEEKS GESTATION OF PREGNANCY: Primary | ICD-10-CM

## 2019-07-25 PROCEDURE — G8427 DOCREV CUR MEDS BY ELIG CLIN: HCPCS | Performed by: ADVANCED PRACTICE MIDWIFE

## 2019-07-25 PROCEDURE — G8419 CALC BMI OUT NRM PARAM NOF/U: HCPCS | Performed by: ADVANCED PRACTICE MIDWIFE

## 2019-07-25 PROCEDURE — 99213 OFFICE O/P EST LOW 20 MIN: CPT | Performed by: ADVANCED PRACTICE MIDWIFE

## 2019-07-25 PROCEDURE — 1036F TOBACCO NON-USER: CPT | Performed by: ADVANCED PRACTICE MIDWIFE

## 2019-07-25 RX ORDER — BREAST PUMP
1 EACH MISCELLANEOUS PRN
Qty: 1 EACH | Refills: 0 | Status: SHIPPED | OUTPATIENT
Start: 2019-07-25

## 2019-08-08 ENCOUNTER — ROUTINE PRENATAL (OUTPATIENT)
Dept: OBGYN CLINIC | Age: 29
End: 2019-08-08
Payer: MEDICARE

## 2019-08-08 VITALS
WEIGHT: 169.3 LBS | BODY MASS INDEX: 29.06 KG/M2 | HEART RATE: 106 BPM | SYSTOLIC BLOOD PRESSURE: 122 MMHG | DIASTOLIC BLOOD PRESSURE: 73 MMHG

## 2019-08-08 DIAGNOSIS — Z3A.34 34 WEEKS GESTATION OF PREGNANCY: ICD-10-CM

## 2019-08-08 DIAGNOSIS — Z34.90 NORMAL PREGNANCY, ANTEPARTUM: Primary | ICD-10-CM

## 2019-08-08 PROCEDURE — 99213 OFFICE O/P EST LOW 20 MIN: CPT | Performed by: ADVANCED PRACTICE MIDWIFE

## 2019-08-08 PROCEDURE — 1036F TOBACCO NON-USER: CPT | Performed by: ADVANCED PRACTICE MIDWIFE

## 2019-08-08 PROCEDURE — G8427 DOCREV CUR MEDS BY ELIG CLIN: HCPCS | Performed by: ADVANCED PRACTICE MIDWIFE

## 2019-08-08 PROCEDURE — G8419 CALC BMI OUT NRM PARAM NOF/U: HCPCS | Performed by: ADVANCED PRACTICE MIDWIFE

## 2019-08-23 ENCOUNTER — HOSPITAL ENCOUNTER (OUTPATIENT)
Age: 29
Setting detail: SPECIMEN
Discharge: HOME | End: 2019-08-23
Payer: MEDICARE

## 2019-08-23 ENCOUNTER — ROUTINE PRENATAL (OUTPATIENT)
Dept: OBGYN CLINIC | Age: 29
End: 2019-08-23
Payer: MEDICARE

## 2019-08-23 VITALS
DIASTOLIC BLOOD PRESSURE: 68 MMHG | BODY MASS INDEX: 29.4 KG/M2 | WEIGHT: 171.3 LBS | SYSTOLIC BLOOD PRESSURE: 114 MMHG | HEART RATE: 98 BPM

## 2019-08-23 DIAGNOSIS — Z3A.36 36 WEEKS GESTATION OF PREGNANCY: Primary | ICD-10-CM

## 2019-08-23 DIAGNOSIS — Z3A.36 36 WEEKS GESTATION OF PREGNANCY: ICD-10-CM

## 2019-08-23 PROCEDURE — 1036F TOBACCO NON-USER: CPT | Performed by: ADVANCED PRACTICE MIDWIFE

## 2019-08-23 PROCEDURE — 99213 OFFICE O/P EST LOW 20 MIN: CPT | Performed by: ADVANCED PRACTICE MIDWIFE

## 2019-08-23 PROCEDURE — G8427 DOCREV CUR MEDS BY ELIG CLIN: HCPCS | Performed by: ADVANCED PRACTICE MIDWIFE

## 2019-08-23 PROCEDURE — G8419 CALC BMI OUT NRM PARAM NOF/U: HCPCS | Performed by: ADVANCED PRACTICE MIDWIFE

## 2019-08-26 LAB
CULTURE: NORMAL
Lab: NORMAL
SPECIMEN DESCRIPTION: NORMAL

## 2019-08-29 ENCOUNTER — ROUTINE PRENATAL (OUTPATIENT)
Dept: OBGYN CLINIC | Age: 29
End: 2019-08-29
Payer: MEDICARE

## 2019-08-29 VITALS
SYSTOLIC BLOOD PRESSURE: 113 MMHG | BODY MASS INDEX: 29.76 KG/M2 | HEART RATE: 95 BPM | WEIGHT: 173.4 LBS | DIASTOLIC BLOOD PRESSURE: 72 MMHG

## 2019-08-29 DIAGNOSIS — Z34.90 NORMAL PREGNANCY, ANTEPARTUM: Primary | ICD-10-CM

## 2019-08-29 DIAGNOSIS — Z3A.37 37 WEEKS GESTATION OF PREGNANCY: ICD-10-CM

## 2019-08-29 PROCEDURE — G8427 DOCREV CUR MEDS BY ELIG CLIN: HCPCS | Performed by: ADVANCED PRACTICE MIDWIFE

## 2019-08-29 PROCEDURE — 99213 OFFICE O/P EST LOW 20 MIN: CPT | Performed by: ADVANCED PRACTICE MIDWIFE

## 2019-08-29 PROCEDURE — G8419 CALC BMI OUT NRM PARAM NOF/U: HCPCS | Performed by: ADVANCED PRACTICE MIDWIFE

## 2019-08-29 PROCEDURE — 1036F TOBACCO NON-USER: CPT | Performed by: ADVANCED PRACTICE MIDWIFE

## 2019-08-29 NOTE — PATIENT INSTRUCTIONS
Patient Education        Counting Your Baby's Kicks: Care Instructions  Your Care Instructions    Counting your baby's kicks is one way your doctor can tell that your baby is healthy. Most women--especially in a first pregnancy--feel their baby move for the first time between 16 and 22 weeks. The movement may feel like flutters rather than kicks. Your baby may move more at certain times of the day. When you are active, you may notice less kicking than when you are resting. At your prenatal visits, your doctor will ask whether the baby is active. In your last trimester, your doctor may ask you to count the number of times you feel your baby move. Follow-up care is a key part of your treatment and safety. Be sure to make and go to all appointments, and call your doctor if you are having problems. It's also a good idea to know your test results and keep a list of the medicines you take. How do you count fetal kicks? · A common method of checking your baby's movement is to count the number of kicks or moves you feel in 1 hour. Ten movements (such as kicks, flutters, or rolls) in 1 hour are normal. Some doctors suggest that you count in the morning until you get to 10 movements. Then you can quit for that day and start again the next day. · Pick your baby's most active time of day to count. This may be any time from morning to evening. · If you do not feel 10 movements in an hour, your baby may be sleeping. Wait for the next hour and count again. When should you call for help? Call your doctor now or seek immediate medical care if:    · You noticed that your baby has stopped moving or is moving much less than normal.    Watch closely for changes in your health, and be sure to contact your doctor if you have any problems. Where can you learn more? Go to https://chnikole.Heyzap. org and sign in to your [employer] account.  Enter I948 in the Finance[employer] box to learn more about \"Counting Your Baby's Kicks: Care Instructions. \"     If you do not have an account, please click on the \"Sign Up Now\" link. Current as of: September 5, 2018  Content Version: 12.1  © 2406-9914 [employer]wise, Incorporated. Care instructions adapted under license by [facility] ([facility]). If you have questions about a medical condition or this instruction, always ask your healthcare professional. [place] [address] any warranty or liability for your use of this information. Patient Education        Week 37 of Your Pregnancy: Care Instructions  Your Care Instructions    You are near the end of your pregnancy--and you're probably pretty uncomfortable. It may be harder to walk around. Lying down probably isn't comfortable either. You may have trouble getting to sleep or staying asleep. Most women deliver their babies between 40 and 41 weeks. This is a good time to think about packing a bag for the hospital with items you'll need. Then you'll be ready when labor starts. Follow-up care is a key part of your treatment and safety. Be sure to make and go to all appointments, and call your doctor if you are having problems. It's also a good idea to know your test results and keep a list of the medicines you take. How can you care for yourself at home? Learn about breastfeeding  · Breastfeeding is best for your baby and good for you. · Breast milk has antibodies to help your baby fight infections. · Mothers who breastfeed often lose weight faster, because making milk burns calories. · Learning the best ways to hold your baby will make breastfeeding easier. · Let your partner bathe and diaper the baby to keep your partner from feeling left out. Snuggle together when you breastfeed. · You may want to learn how to use a breast pump and store your milk. · If you choose to bottle feed, make the feeding feel like breastfeeding so you can bond with your baby. Always hold your baby and the bottle.  Do not prop bottles or let

## 2019-09-05 ENCOUNTER — ROUTINE PRENATAL (OUTPATIENT)
Dept: OBGYN CLINIC | Age: 29
End: 2019-09-05
Payer: MEDICARE

## 2019-09-05 VITALS
SYSTOLIC BLOOD PRESSURE: 131 MMHG | BODY MASS INDEX: 30.35 KG/M2 | HEART RATE: 112 BPM | WEIGHT: 176.8 LBS | DIASTOLIC BLOOD PRESSURE: 78 MMHG

## 2019-09-05 DIAGNOSIS — Z34.90 NORMAL PREGNANCY, ANTEPARTUM: Primary | ICD-10-CM

## 2019-09-05 DIAGNOSIS — Z87.59 HISTORY OF PRECIPITOUS DELIVERY: ICD-10-CM

## 2019-09-05 DIAGNOSIS — Z3A.38 38 WEEKS GESTATION OF PREGNANCY: ICD-10-CM

## 2019-09-05 PROBLEM — Z28.21 REFUSED INFLUENZA VACCINE: Status: RESOLVED | Noted: 2019-02-20 | Resolved: 2019-09-05

## 2019-09-05 PROCEDURE — 99213 OFFICE O/P EST LOW 20 MIN: CPT | Performed by: ADVANCED PRACTICE MIDWIFE

## 2019-09-05 NOTE — PROGRESS NOTES
SUBJECTIVE:  Lauro Gowers is here for her routine OB visit. She reports fetal movement. She denies vaginal bleeding. She denies leaking of fluid. She denies vaginal discharge. She reports intermittent cramping, denies regular or strong uterine contraction activity. She denies nausea and/or vomiting. She denies retaining fluid in her extremities. Lauro Gowers reports she is feeling overall well but tired and ready to be done with pregnancy. Reports she saw an article on the web that hiccups after 28 weeks can be concern for problems with blood flow to baby. OBJECTIVE:   Blood pressure 131/78, pulse 112, weight 176 lb 12.8 oz (80.2 kg), last menstrual period 12/13/2018, currently breastfeeding. SVE: deferred    ASSESSMENT/PLAN:  IUP @ 38 weeks 0 days  Kody Boston will monitor for fetal movements daily.  Fetal Kick Count was discussed and explained. She was instructed to lay on her left side 20 minutes after eating and count movements for up to 2 hours with a target value of 10 movements. Instructed to notify office if she does not make the target.  Signs of labor to report were discussed.  Birth preferences were discussed.  Post-dates testing and protocol were discussed  Kody Boston was counseled regarding Post Partum Depression.  She plans for NFP after delivery   Reviewed how to contact midwives and encouraged to review route to the hospital.    Discussed PP pitocin including R/B/A, states she does not want it unless she truly needs it. Discussed may be counseled again at the hospital and may have to sign declination form, pt agreeable.  Discussed source for article of her concern does not have credible sources and that fetal movement, growth of FH, and reassuring FHT with dopplers let us know that baby is doing well and that baby's hiccups do not indicate blood flow issue as the article notes.    S = D  History of precipitous delivery  · Reviewed midwife number to notify of plan to head to

## 2019-09-12 ENCOUNTER — ROUTINE PRENATAL (OUTPATIENT)
Dept: OBGYN CLINIC | Age: 29
End: 2019-09-12
Payer: MEDICARE

## 2019-09-12 VITALS
HEART RATE: 101 BPM | BODY MASS INDEX: 30.62 KG/M2 | DIASTOLIC BLOOD PRESSURE: 80 MMHG | SYSTOLIC BLOOD PRESSURE: 120 MMHG | WEIGHT: 178.38 LBS

## 2019-09-12 DIAGNOSIS — Z34.90 NORMAL PREGNANCY, ANTEPARTUM: Primary | ICD-10-CM

## 2019-09-12 DIAGNOSIS — Z3A.39 39 WEEKS GESTATION OF PREGNANCY: ICD-10-CM

## 2019-09-12 PROCEDURE — 1036F TOBACCO NON-USER: CPT | Performed by: ADVANCED PRACTICE MIDWIFE

## 2019-09-12 PROCEDURE — G8427 DOCREV CUR MEDS BY ELIG CLIN: HCPCS | Performed by: ADVANCED PRACTICE MIDWIFE

## 2019-09-12 PROCEDURE — 99213 OFFICE O/P EST LOW 20 MIN: CPT | Performed by: ADVANCED PRACTICE MIDWIFE

## 2019-09-12 PROCEDURE — G8417 CALC BMI ABV UP PARAM F/U: HCPCS | Performed by: ADVANCED PRACTICE MIDWIFE

## 2019-09-18 ENCOUNTER — ROUTINE PRENATAL (OUTPATIENT)
Dept: OBGYN CLINIC | Age: 29
End: 2019-09-18
Payer: MEDICARE

## 2019-09-18 VITALS
HEART RATE: 104 BPM | BODY MASS INDEX: 30.73 KG/M2 | DIASTOLIC BLOOD PRESSURE: 78 MMHG | WEIGHT: 179 LBS | SYSTOLIC BLOOD PRESSURE: 127 MMHG

## 2019-09-18 DIAGNOSIS — Z3A.39 39 WEEKS GESTATION OF PREGNANCY: Primary | ICD-10-CM

## 2019-09-18 DIAGNOSIS — Z34.90 NORMAL PREGNANCY, ANTEPARTUM: ICD-10-CM

## 2019-09-18 DIAGNOSIS — O36.8130 DECREASED FETAL MOVEMENTS IN THIRD TRIMESTER, SINGLE OR UNSPECIFIED FETUS: ICD-10-CM

## 2019-09-18 PROCEDURE — 59025 FETAL NON-STRESS TEST: CPT | Performed by: ADVANCED PRACTICE MIDWIFE

## 2019-09-18 PROCEDURE — 99213 OFFICE O/P EST LOW 20 MIN: CPT | Performed by: ADVANCED PRACTICE MIDWIFE

## 2019-09-18 PROCEDURE — G8427 DOCREV CUR MEDS BY ELIG CLIN: HCPCS | Performed by: ADVANCED PRACTICE MIDWIFE

## 2019-09-18 PROCEDURE — G8417 CALC BMI ABV UP PARAM F/U: HCPCS | Performed by: ADVANCED PRACTICE MIDWIFE

## 2019-09-18 PROCEDURE — 1036F TOBACCO NON-USER: CPT | Performed by: ADVANCED PRACTICE MIDWIFE

## 2019-09-18 NOTE — PROGRESS NOTES
SUBJECTIVE:    Dmitriy Rosales is here for her routine OB visit. Doing well waiting but admits is anxious. Would like check. Having [facility]  She reports  fetal movement but feels it is decreased and anxious about this too  She denies  vaginal bleeding. She denies  leaking of fluid. She denies  vaginal discharge. She denies  nausea and/or vomiting. She denies retaining fluid in her extremities. OBJECTIVE:   Blood pressure 127/78, pulse 104, weight 179 lb (81.2 kg), last menstrual period 12/13/2018, currently breastfeeding. SVE: Mid-position/softening/1-2 cm/50%/-2                               0             1          2         3         Patient  Dilation            Closed      1-2      3-4       5-6         1  Effacement       0-30       40-50   60-70    >80        1  Station             -3              -2       -1/0      +1/+2      1  Consistency     Firm        Med     Soft                    1  Position            Post        Mid       Ant                    1  TOTAL                                                                    5    NST:  Baseline:  135  Variability:  Moderate  Accelerations: Present  Decelerations:  Absent      ASSESSMENT/PLAN:    1. 39 weeks gestation of pregnancy  S=D    2. Normal pregnancy, antepartum  The problem list was reviewed and updated as needed. Dmitriy Rosales will monitor for fetal movements daily    GBS protocol and testing was discussed. GBS culture is negative  Signs of labor to report were discussed. Birth preferences were discussed. Post-dates testing and protocol were discussed. Opted for SVE with stripping today  Dmitriy Rosales was counseled regarding Post Partum Depression. 3. Decreased fetal movements in third trimester, single or unspecified fetus  - NST and reinforced [facility]    Patient was seen with total faced to face time of 15 minutes. More than 50% of this visit was on counseling and education.

## 2019-09-21 ENCOUNTER — HOSPITAL ENCOUNTER (INPATIENT)
Age: 29
LOS: 2 days | Discharge: HOME | DRG: 560 | End: 2019-09-23
Attending: OBSTETRICS & GYNECOLOGY | Admitting: OBSTETRICS & GYNECOLOGY
Payer: MEDICARE

## 2019-09-21 PROBLEM — O09.90 HRP (HIGH RISK PREGNANCY), UNSPECIFIED TRIMESTER: Status: ACTIVE | Noted: 2019-09-21

## 2019-09-21 LAB
-: ABNORMAL
ABO/RH: NORMAL
ABSOLUTE EOS #: 0 K/UL (ref 0–0.4)
ABSOLUTE IMMATURE GRANULOCYTE: 0 K/UL (ref 0–0.3)
ABSOLUTE LYMPH #: 1.13 K/UL (ref 1–4.8)
ABSOLUTE MONO #: 0.68 K/UL (ref 0.1–0.8)
AMORPHOUS: ABNORMAL
AMPHETAMINE SCREEN URINE: NEGATIVE
ANTIBODY SCREEN: NEGATIVE
ARM BAND NUMBER: NORMAL
BACTERIA: ABNORMAL
BARBITURATE SCREEN URINE: NEGATIVE
BASOPHILS # BLD: 0 % (ref 0–2)
BASOPHILS ABSOLUTE: 0 K/UL (ref 0–0.2)
BENZODIAZEPINE SCREEN, URINE: NEGATIVE
BILIRUBIN URINE: NEGATIVE
BUPRENORPHINE URINE: NORMAL
CANNABINOID SCREEN URINE: NEGATIVE
CASTS UA: ABNORMAL /LPF (ref 0–8)
COCAINE METABOLITE, URINE: NEGATIVE
COLOR: YELLOW
COMMENT UA: ABNORMAL
CRYSTALS, UA: ABNORMAL /HPF
DIFFERENTIAL TYPE: ABNORMAL
EOSINOPHILS RELATIVE PERCENT: 0 % (ref 1–4)
EPITHELIAL CELLS UA: ABNORMAL /HPF (ref 0–5)
EXPIRATION DATE: NORMAL
GLUCOSE URINE: NEGATIVE
HCT VFR BLD CALC: 36.3 % (ref 36.3–47.1)
HEMOGLOBIN: 10.2 G/DL (ref 11.9–15.1)
IMMATURE GRANULOCYTES: 0 %
KETONES, URINE: NEGATIVE
LEUKOCYTE ESTERASE, URINE: ABNORMAL
LYMPHOCYTES # BLD: 10 % (ref 24–44)
MCH RBC QN AUTO: 21.6 PG (ref 25.2–33.5)
MCHC RBC AUTO-ENTMCNC: 28.1 G/DL (ref 28.4–34.8)
MCV RBC AUTO: 76.7 FL (ref 82.6–102.9)
MDMA URINE: NORMAL
METHADONE SCREEN, URINE: NEGATIVE
METHAMPHETAMINE, URINE: NORMAL
MONOCYTES # BLD: 6 % (ref 1–7)
MORPHOLOGY: ABNORMAL
MUCUS: ABNORMAL
NITRITE, URINE: NEGATIVE
NRBC AUTOMATED: 0 PER 100 WBC
NUCLEATED RED BLOOD CELLS: 1 PER 100 WBC
OPIATES, URINE: NEGATIVE
OTHER OBSERVATIONS UA: ABNORMAL
OXYCODONE SCREEN URINE: NEGATIVE
PDW BLD-RTO: 16 % (ref 11.8–14.4)
PH UA: 7.5 (ref 5–8)
PHENCYCLIDINE, URINE: NEGATIVE
PLATELET # BLD: 195 K/UL (ref 138–453)
PLATELET ESTIMATE: ABNORMAL
PMV BLD AUTO: 11.3 FL (ref 8.1–13.5)
PROPOXYPHENE, URINE: NORMAL
PROTEIN UA: NEGATIVE
RBC # BLD: 4.73 M/UL (ref 3.95–5.11)
RBC # BLD: ABNORMAL 10*6/UL
RBC UA: ABNORMAL /HPF (ref 0–4)
RENAL EPITHELIAL, UA: ABNORMAL /HPF
SEG NEUTROPHILS: 84 % (ref 36–66)
SEGMENTED NEUTROPHILS ABSOLUTE COUNT: 9.49 K/UL (ref 1.8–7.7)
SPECIFIC GRAVITY UA: 1.01 (ref 1–1.03)
T. PALLIDUM, IGG: NONREACTIVE
TEST INFORMATION: NORMAL
TRICHOMONAS: ABNORMAL
TRICYCLIC ANTIDEPRESSANTS, UR: NORMAL
TURBIDITY: ABNORMAL
URINE HGB: ABNORMAL
UROBILINOGEN, URINE: NORMAL
WBC # BLD: 11.3 K/UL (ref 3.5–11.3)
WBC # BLD: ABNORMAL 10*3/UL
WBC UA: ABNORMAL /HPF (ref 0–5)
YEAST: ABNORMAL

## 2019-09-21 PROCEDURE — 85025 COMPLETE CBC W/AUTO DIFF WBC: CPT

## 2019-09-21 PROCEDURE — 59409 OBSTETRICAL CARE: CPT | Performed by: ADVANCED PRACTICE MIDWIFE

## 2019-09-21 PROCEDURE — 0KQM0ZZ REPAIR PERINEUM MUSCLE, OPEN APPROACH: ICD-10-PCS | Performed by: OBSTETRICS & GYNECOLOGY

## 2019-09-21 PROCEDURE — 1220000000 HC SEMI PRIVATE OB R&B

## 2019-09-21 PROCEDURE — 86901 BLOOD TYPING SEROLOGIC RH(D): CPT

## 2019-09-21 PROCEDURE — 86850 RBC ANTIBODY SCREEN: CPT

## 2019-09-21 PROCEDURE — 86780 TREPONEMA PALLIDUM: CPT

## 2019-09-21 PROCEDURE — 86900 BLOOD TYPING SEROLOGIC ABO: CPT

## 2019-09-21 PROCEDURE — 6360000002 HC RX W HCPCS: Performed by: STUDENT IN AN ORGANIZED HEALTH CARE EDUCATION/TRAINING PROGRAM

## 2019-09-21 PROCEDURE — 87086 URINE CULTURE/COLONY COUNT: CPT

## 2019-09-21 PROCEDURE — 81001 URINALYSIS AUTO W/SCOPE: CPT

## 2019-09-21 PROCEDURE — 7200000001 HC VAGINAL DELIVERY

## 2019-09-21 PROCEDURE — 2500000003 HC RX 250 WO HCPCS

## 2019-09-21 PROCEDURE — 80307 DRUG TEST PRSMV CHEM ANLYZR: CPT

## 2019-09-21 PROCEDURE — 2580000003 HC RX 258: Performed by: STUDENT IN AN ORGANIZED HEALTH CARE EDUCATION/TRAINING PROGRAM

## 2019-09-21 RX ORDER — DOCUSATE SODIUM 100 MG/1
100 CAPSULE, LIQUID FILLED ORAL 2 TIMES DAILY
Qty: 60 CAPSULE | Refills: 1 | Status: SHIPPED | OUTPATIENT
Start: 2019-09-21 | End: 2019-10-21

## 2019-09-21 RX ORDER — SODIUM CHLORIDE 0.9 % (FLUSH) 0.9 %
10 SYRINGE (ML) INJECTION EVERY 12 HOURS SCHEDULED
Status: DISCONTINUED | OUTPATIENT
Start: 2019-09-21 | End: 2019-09-23 | Stop reason: HOSPADM

## 2019-09-21 RX ORDER — SODIUM CHLORIDE 0.9 % (FLUSH) 0.9 %
10 SYRINGE (ML) INJECTION PRN
Status: DISCONTINUED | OUTPATIENT
Start: 2019-09-21 | End: 2019-09-23 | Stop reason: HOSPADM

## 2019-09-21 RX ORDER — BISACODYL 10 MG
10 SUPPOSITORY, RECTAL RECTAL DAILY PRN
Status: DISCONTINUED | OUTPATIENT
Start: 2019-09-21 | End: 2019-09-23 | Stop reason: HOSPADM

## 2019-09-21 RX ORDER — ONDANSETRON 2 MG/ML
4 INJECTION INTRAMUSCULAR; INTRAVENOUS EVERY 6 HOURS PRN
Status: DISCONTINUED | OUTPATIENT
Start: 2019-09-21 | End: 2019-09-21

## 2019-09-21 RX ORDER — IBUPROFEN 600 MG/1
600 TABLET ORAL EVERY 6 HOURS PRN
Qty: 30 TABLET | Refills: 0 | Status: SHIPPED | OUTPATIENT
Start: 2019-09-21

## 2019-09-21 RX ORDER — LIDOCAINE HYDROCHLORIDE 10 MG/ML
INJECTION, SOLUTION INFILTRATION; PERINEURAL
Status: DISCONTINUED
Start: 2019-09-21 | End: 2019-09-21

## 2019-09-21 RX ORDER — LIDOCAINE HYDROCHLORIDE 10 MG/ML
20 INJECTION, SOLUTION EPIDURAL; INFILTRATION; INTRACAUDAL; PERINEURAL ONCE
Status: DISCONTINUED | OUTPATIENT
Start: 2019-09-21 | End: 2019-09-21

## 2019-09-21 RX ORDER — ACETAMINOPHEN 500 MG
1000 TABLET ORAL EVERY 6 HOURS PRN
Status: DISCONTINUED | OUTPATIENT
Start: 2019-09-21 | End: 2019-09-21 | Stop reason: HOSPADM

## 2019-09-21 RX ORDER — ACETAMINOPHEN 325 MG/1
650 TABLET ORAL EVERY 4 HOURS PRN
Status: DISCONTINUED | OUTPATIENT
Start: 2019-09-21 | End: 2019-09-23 | Stop reason: HOSPADM

## 2019-09-21 RX ORDER — SIMETHICONE 80 MG
80 TABLET,CHEWABLE ORAL EVERY 6 HOURS PRN
Status: DISCONTINUED | OUTPATIENT
Start: 2019-09-21 | End: 2019-09-23 | Stop reason: HOSPADM

## 2019-09-21 RX ORDER — LANOLIN 100 %
OINTMENT (GRAM) TOPICAL PRN
Status: DISCONTINUED | OUTPATIENT
Start: 2019-09-21 | End: 2019-09-23 | Stop reason: HOSPADM

## 2019-09-21 RX ORDER — SODIUM CHLORIDE, SODIUM LACTATE, POTASSIUM CHLORIDE, CALCIUM CHLORIDE 600; 310; 30; 20 MG/100ML; MG/100ML; MG/100ML; MG/100ML
INJECTION, SOLUTION INTRAVENOUS CONTINUOUS
Status: DISCONTINUED | OUTPATIENT
Start: 2019-09-21 | End: 2019-09-21

## 2019-09-21 RX ORDER — DOCUSATE SODIUM 100 MG/1
100 CAPSULE, LIQUID FILLED ORAL 2 TIMES DAILY PRN
Status: DISCONTINUED | OUTPATIENT
Start: 2019-09-21 | End: 2019-09-23 | Stop reason: HOSPADM

## 2019-09-21 RX ORDER — DIPHENHYDRAMINE HCL 25 MG
25 TABLET ORAL EVERY 4 HOURS PRN
Status: DISCONTINUED | OUTPATIENT
Start: 2019-09-21 | End: 2019-09-21

## 2019-09-21 RX ORDER — POLYETHYLENE GLYCOL 3350 17 G/17G
17 POWDER, FOR SOLUTION ORAL DAILY PRN
Status: DISCONTINUED | OUTPATIENT
Start: 2019-09-21 | End: 2019-09-23 | Stop reason: HOSPADM

## 2019-09-21 RX ORDER — ONDANSETRON 4 MG/1
8 TABLET, FILM COATED ORAL EVERY 8 HOURS PRN
Status: DISCONTINUED | OUTPATIENT
Start: 2019-09-21 | End: 2019-09-23 | Stop reason: HOSPADM

## 2019-09-21 RX ORDER — IBUPROFEN 800 MG/1
800 TABLET ORAL EVERY 8 HOURS PRN
Status: DISCONTINUED | OUTPATIENT
Start: 2019-09-21 | End: 2019-09-23 | Stop reason: HOSPADM

## 2019-09-21 RX ADMIN — LIDOCAINE HYDROCHLORIDE: 10 INJECTION, SOLUTION INFILTRATION; PERINEURAL at 12:00

## 2019-09-21 RX ADMIN — SODIUM CHLORIDE, POTASSIUM CHLORIDE, SODIUM LACTATE AND CALCIUM CHLORIDE: 600; 310; 30; 20 INJECTION, SOLUTION INTRAVENOUS at 10:57

## 2019-09-21 RX ADMIN — Medication 50 MILLI-UNITS/MIN: at 11:55

## 2019-09-21 ASSESSMENT — PAIN SCALES - GENERAL
PAINLEVEL_OUTOF10: 2
PAINLEVEL_OUTOF10: 0

## 2019-09-21 NOTE — PROGRESS NOTES
OB/GYN MMW Resident Involvement Note     Date: 2019       Time: 2:26 PM   Patient Name: Haven Rose     Patient : 1990  Room/Bed: 0544/2283-19    Admission Date/Time: 2019 10:17 AM      HPI: Haven Rose is a 25 y.o. [id number] at 40w2d who presents with contractions that have been worsening in intensity and frequency. The patient reports fetal movement is present, complains of contractions, denies loss of fluid, denies vaginal bleeding. Patient denies RUQ pain, nausea, vomitting, vision changes, HA, vaginal bleeding, irritation, itching, hematuria, dysuria, chest pain, SOB, F/C, and N/V/D. Patients pregnancy is complicated by Hx of elevated Homocysteine levels, Hx of PP depression, and Hx of precipitous delivery. DATING:  LMP: Patient's last menstrual period was 2018 (exact date).   Estimated Date of Delivery: 19   Based on: LMP, with an early U/S at 12 5/7 weeks GA    PREGNANCY RISK FACTORS:  Patient Active Problem List   Diagnosis    History of elevated homocysteine    Normal pregnancy, antepartum    History of precipitous delivery    History of postpartum depression    Homocysteine level above reference range    Need for Tdap vaccination    HRP (high risk pregnancy), unspecified trimester     19 M Apg 8/9 Wt 9#7        Steroids Given In This Pregnancy:  no     REVIEW OF SYSTEMS:  Constitutional: negative fever, negative chills  HEENT: negative visual disturbances, negative headaches  Respiratory: negative dyspnea, negative cough  Cardiovascular: negative chest pain,  negative palpitations  Gastrointestinal: positive abdominal pain, negative RUQ pain, negative N/V, negative diarrhea, negative constipation  Genitourinary: negative dysuria, negative vaginal discharge  Dermatological: negative rash  Hematologic: negative bruising  Immunologic/Lymphatic: negative recent illness, negative recent sick contact  Musculoskeletal: negative back No      ASSESSMENT & PLAN:  Gisela Rodriguez is a 25 y.o. female [id number] at 40w2d IUP   - GBS negative / Rh positive / R immune   - No indication for GBS prophylaxis   - Anatomy US: No fetal anomies, Anterior, Normal cord insertion 3VC, Male     Spontaneous labor   - REINALDO Baker, CNM   - VSS, afebrile   - cEFM/ TOCO: Cat 1   - Diet: Genreal   - IVF   - CBC, T&S, Tpal, Ua, UDS pending   - Continue expectant management     UDS, ordered, Informed consent obtained. Discussed R/B/A. All questions and concerns answered. Patient verbalized understanding and agreement to plan. Elevated Homocysteine levels     Hx of PP depression    - Patient denies SI/HI   - Stable on no medication    Hx of precipitous delivery          Patient Active Problem List    Diagnosis Date Noted    History of elevated homocysteine 2013     Priority: Low    HRP (high risk pregnancy), unspecified trimester 2019     19 M Apg 8/9 Wt 9#7 2019    Need for Tdap vaccination 2019 Will get in hospital      Homocysteine level above reference range 2019    History of precipitous delivery 2019    History of postpartum depression 2019     EPDS 6/5/ in pregnancy      Normal pregnancy, antepartum 2019     Requests genetic screening, [facility] referral sent 19  NT: 1.3 mm  Order msAFP @16-20w: negative  Anatomy scan: f/up as clinically indicated   Glucola: 129  Sent to w/s for chart review (19)  Chart reviewed. Electronically signed by Hans Sanchez DO on 2019 at 4:30 PM         Plan discussed with Dwayne Cowan, who is agreeable. Risks, benefits, alternatives and possible complications have been discussed in detail with the patient. Admission, and post admission procedures and expectations were discussed in detail. All questions were answered.     Attending's Name: Dr. Milly Dong (In-house attending)    Maged Blanco DO  Ob/Gyn Resident  2019, 2:26

## 2019-09-21 NOTE — PROGRESS NOTES
IN room to see patient, and to assess. Denies pain, denies needs. Assisted with breastfeeding,  Mahnaz Moran at bedside. Call light within reach if needed.

## 2019-09-21 NOTE — L&D DELIVERY NOTE
Placenta    Date/time:  2019 11:55:00  Removal:  Spontaneous  Appearance:  Intact  Disposition:  Lab, Pathology     Delivery Resuscitation    Method:  Bulb Suction, Stimulation     Apgars    Living status:  Living  Apgars   1 Minute:   5 Minute:   10 Minute 15 Minute 20 Minute   Skin Color: 0  1       Heart Rate: 2  2       Reflex Irritability: 2  2       Muscle Tone: 2  2       Respiratory Effort: 2  2       Total: 8  9               Apgars Assigned By:  Eve Claros RN     Skin to Skin    Skin to skin initiation date/time: 19 11:46:00   Skin to skin with: Mother  Skin to skin end date/time:         Measurements    Weight:  4280 g Length:  54 cm   Head circumference:  36.5 cm Chest circumference:  37 cm   Abdominal girth:  35 cm       Delivery Information    Episiotomy:  None  Perineal lacerations:  2nd Repaired:  Yes   Vaginal laceration:  No    Cervical laceration:  No    Vaginal delivery est. blood loss (mL):  300  Surgical or additional est. blood loss (mL):  0 (View Only):  Edit in Flowsheets   Combined est. blood loss (mL):  300     Vaginal Delivery Counts    Initial count personnel:  RAMYA ACKERMAN   4x4:   Needles:   Instruments:   Lap Pads:   Sponges:     Initial counts:          Final counts:          Final count personnel:  DR. Arnold Menezes  Accurate final count?:  Yes  Final vaginal sweep completed:  Yes     Other Procedures    Procedures:  None     Labor Length    2nd stage:  0h 39m  3rd stage:  0h 09m          [facility] Vaginal Delivery Summary    Labor & Delivery Summary  Dilation Complete Date: 19  Dilation Complete Time: 1107     Information for the patient's :   Charli Uriasa Boy Elizabeth Fails [[id number]]          Pre-operative Diagnosis:  Term pregnancy, Single fetus and Uncomplicated pregnancy    Post-operative Diagnosis:  Same, delivered    Procedure:  Spontaneous vaginal delivery, 2nd degree laceration repaired    Provider:    Tommy Matute and [address]

## 2019-09-21 NOTE — H&P
[facility] Obstetrics History and Physical  2019  10:30 AM      SUBJECTIVE:    CHIEF COMPLAINT:  Here for contractions  HISTORY OF PRESENT ILLNESS:      The patient is a 25 y.o. female at 41w4d. Richard Fernandes presents with a chief complaint as above and is being admitted for active phase labor. States contractions started early this morning having gotten stronger. She feels urge to push with contractions. Course of pregnancy complicated by:     Patient Active Problem List   Diagnosis    History of elevated homocysteine    Normal pregnancy, antepartum    History of precipitous delivery    History of postpartum depression    Homocysteine level above reference range    Need for Tdap vaccination         OBJECTIVE:     Estimated Due Date:   Estimated Date of Delivery: 19   Patient's last menstrual period was 2018 (exact date). c/w 12w5d      PRENATAL LABS:    Blood Type/Rh: A pos  Antibody Screen: negative  Hemoglobin, Hematocrit, Platelets: 28.8 / 46.7 / 189  Rubella: immune  T.  Pallidum, IgG: non-reactive   Hepatitis B Surface Antigen: non-reactive   HIV: non-reactive   Sickle Cell Screen: not done  Gonorrhea: negative  Chlamydia: negative  Urine culture: negative, date: 19    1 hour Glucose Tolerance Test: 129  3 hour Glucose Tolerance Test: n/a  Group B Strep: negative  Cystic Fibrosis Screen: not available  First Trimester Screen: low risk  MSAFP/Multiple Markers: normal  Non-Invasive Prenatal Testing: not done     Steroids:  no    PAST OB HISTORY:  OB History    Para Term  AB Living   7 2 2 0 3 3   SAB TAB Ectopic Molar Multiple Live Births   1 2 0 0 0 3      # Outcome Date GA Lbr Patrick/2nd Weight Sex Delivery Anes PTL Lv   7 Current            6 Term 16 40w4d  8 lb 5.5 oz (3.785 kg) F Vag-Spont None N BERNY      Complications: Precipitous Labor      Apgar1: 8  Apgar5: 9   5  10/24/13 39w2d 03:00 7 lb 14 oz (3.572 kg) F Vag-Spont Local N BERNY      Birth Comments: 2nd degree      Name: Ye Paul   4 2010 6w0d          3 Term 08 39w5d 18:00 7 lb 8 oz (3.402 kg) M Vag-Spont EPI N BERNY      Birth Comments: cervidil induction (caused vomiting throughout entire labor) breast fed x 6 wks      Name: David Churchill   2 TAB            1 TAB                Past Medical History:        Diagnosis Date    Constipation     and diarrhea - since first delivery    Elective  ,     Elevated homocysteine ([facility] [address] 2009    Fracture phalanges, hand age 19    Fx navicular, foot-closed age 13    Hemorrhoids     after delivery #1    Hx of postpartum depression, currently pregnant     Hx of seasonal allergies     previously taking Nasonex       Past Surgical History:        Procedure Laterality Date    INDUCED   ,     WISDOM TOOTH EXTRACTION         Allergies:    No known allergies    Social History:    Social History     Socioeconomic History    Marital status: Single     Spouse name: Not on file    Number of children: Not on file    Years of education: Not on file    Highest education level: Not on file   Occupational History    Not on file   Social Needs    Financial resource strain: Not on file    Food insecurity:     Worry: Not on file     Inability: Not on file    Transportation needs:     Medical: Not on file     Non-medical: Not on file   Tobacco Use    Smoking status: Former Smoker     Packs/day: 0.25     Years: 3.00     Pack years: 0.75     Last attempt to quit: 2009     Years since quitting: 10.7    Smokeless tobacco: Never Used   Substance and Sexual Activity    Alcohol use: No     Alcohol/week: 0.0 standard drinks    Drug use: No    Sexual activity: Yes     Partners: Male   Lifestyle    Physical activity:     Days per week: Not on file     Minutes per session: Not on file    Stress: Not on file   Relationships    Social connections:     Talks on phone: Not on file     Gets together: Not

## 2019-09-22 PROBLEM — O09.90 HRP (HIGH RISK PREGNANCY), UNSPECIFIED TRIMESTER: Status: RESOLVED | Noted: 2019-09-21 | Resolved: 2019-09-22

## 2019-09-22 PROBLEM — Z34.90 NORMAL PREGNANCY, ANTEPARTUM: Status: RESOLVED | Noted: 2019-02-20 | Resolved: 2019-09-22

## 2019-09-22 LAB
CULTURE: NORMAL
Lab: NORMAL
SPECIMEN DESCRIPTION: NORMAL

## 2019-09-22 PROCEDURE — 1220000000 HC SEMI PRIVATE OB R&B

## 2019-09-22 ASSESSMENT — PAIN SCALES - GENERAL
PAINLEVEL_OUTOF10: 0

## 2019-09-22 NOTE — PROGRESS NOTES
[id number] PPD # 1 s/p    - Doing well, VSS   - male infant in [address], circumcision desired   - Encourage ambulation   - Postpartum Hgb/Hct only in symptomatic   2. Rh positive/Rubella immune  3. Breast feeding   4. History of PP depression   - no s/s currently   - on no medications  5. Continue post partum care    Counseling Completed:  Secondary Smoke risks and Sudden Infant Death Syndrome were reviewed with recommendations. Infant sleeping, \"back to sleep\" and avoidance of co-sleeping recommendations were reviewed. Signs and Symptoms of Post Partum Depression were reviewed. The patient is to call if any occur. Signs and symptoms of Mastitis were reviewed. The patient is to call if any occur for follow up.   Discharge instructions including pelvic rest, no driving with pain medicine and office follow-up were reviewed with patient     Provider's Name: Sofia Smith DO  Ob/Gyn Resident   2019, 7:03 AM

## 2019-09-23 VITALS
HEART RATE: 98 BPM | HEIGHT: 64 IN | OXYGEN SATURATION: 100 % | TEMPERATURE: 99 F | RESPIRATION RATE: 20 BRPM | SYSTOLIC BLOOD PRESSURE: 120 MMHG | BODY MASS INDEX: 30.56 KG/M2 | WEIGHT: 179 LBS | DIASTOLIC BLOOD PRESSURE: 75 MMHG

## 2019-09-23 PROBLEM — R79.89 HOMOCYSTEINE LEVEL ABOVE REFERENCE RANGE: Status: RESOLVED | Noted: 2019-06-14 | Resolved: 2019-09-23

## 2019-09-23 NOTE — PROGRESS NOTES
[facility] Women[facility]   Vaginal Postpartum Note  Hospital Day: 3  Postpartum Day: 2      SUBJECTIVE:  Voices no concerns today. Doing well. Voiding, ambulating, eating without difficulty. Denies any leg pain. Bonding with baby. Resting well. Lochia is light. Reports pain/cramping and is 0-2/10 on the pain scale. She reports it is controlled with oral meds as needed but is mostly associated with breastfeeding. She denies headache, vision changes, RUQ pain, epigastric pain, swelling. OBJECTIVE:     Vitals:  /75   Pulse 98   Temp 99 °F (37.2 °C) (Oral)   Resp 20   Ht 5' 4\" (1.626 m)   Wt 179 lb (81.2 kg)   LMP 12/13/2018 (Exact Date)   SpO2 100%   Breastfeeding? Unknown   BMI 30.73 kg/m²     Constitutional:  Awake, alert, cooperative, no apparent distress. Appears to be bonding well with baby, does not appear overly stressed with infant handling. Pain:  0-2/10 intermittent abdominal cramping, improves with oral meds PRN    Breasts:  Soft without tenderness, nipples are intact. Abdominal Exam: Soft, non-tender, lax muscle tone. Fundus is mid-line, firm @ U/-1. Non-tender with palpation. Bladder non-distended. Perineum: Repair is approximated without drainage    Anus: Normal in appearance             Hemorrhoid: pink                       Lochia: Small and Rubra    Extremities: Negative Satinder sign. Minimal edema. Voiding QS, no BM yet, passing flatus    Labs:   Lab Results   Component Value Date    HGB 10.2 09/21/2019    HCT 36.3 09/21/2019       ASSESSMENT/PLAN:     Keith Suarez is a [id number] post partum vaginal birth Day 2  · Continue PP care. Encourage rest, hydration, and ambulation as tolerated. · VSS  · Hemoglobin/hematocrit if symptomatic  · Reviewed birth experience and reports satisfaction  · Discharge instructions were reviewed regarding perineal care, breast care, activity, rest, diet, secondhand smoke and increased SIDS risk, hygiene and PP depression.  Questions were

## 2019-09-23 NOTE — PROGRESS NOTES
CLINICAL PHARMACY NOTE: MEDS TO [address] Select Patient?: No  Total # of Prescriptions Filled: 2   The following medications were delivered to the patient:  · dok  · ibuprofen  ·   Total # of Interventions Completed: 0  Time Spent (min): 0    Additional Documentation:

## 2019-09-23 NOTE — FLOWSHEET NOTE
Pt. D/c instuctions reviewed with pt. She is staying courtesy because her baby is staying under photo therapy. Pt. Told she will need to take her own meds & food.

## 2019-10-09 ENCOUNTER — POSTPARTUM VISIT (OUTPATIENT)
Dept: OBGYN CLINIC | Age: 29
End: 2019-10-09
Payer: MEDICARE

## 2019-10-09 VITALS
BODY MASS INDEX: 26.66 KG/M2 | SYSTOLIC BLOOD PRESSURE: 114 MMHG | DIASTOLIC BLOOD PRESSURE: 69 MMHG | HEIGHT: 65 IN | WEIGHT: 160 LBS

## 2019-10-09 PROCEDURE — 1111F DSCHRG MED/CURRENT MED MERGE: CPT | Performed by: ADVANCED PRACTICE MIDWIFE

## 2019-10-09 PROCEDURE — 99213 OFFICE O/P EST LOW 20 MIN: CPT | Performed by: ADVANCED PRACTICE MIDWIFE

## 2019-10-09 PROCEDURE — 1036F TOBACCO NON-USER: CPT | Performed by: ADVANCED PRACTICE MIDWIFE

## 2019-10-09 PROCEDURE — G8427 DOCREV CUR MEDS BY ELIG CLIN: HCPCS | Performed by: ADVANCED PRACTICE MIDWIFE

## 2019-10-09 PROCEDURE — G8417 CALC BMI ABV UP PARAM F/U: HCPCS | Performed by: ADVANCED PRACTICE MIDWIFE

## 2019-10-09 PROCEDURE — G8484 FLU IMMUNIZE NO ADMIN: HCPCS | Performed by: ADVANCED PRACTICE MIDWIFE

## 2019-11-14 ENCOUNTER — POSTPARTUM VISIT (OUTPATIENT)
Dept: OBGYN CLINIC | Age: 29
End: 2019-11-14
Payer: MEDICARE

## 2019-11-14 VITALS
HEART RATE: 71 BPM | SYSTOLIC BLOOD PRESSURE: 107 MMHG | HEIGHT: 65 IN | DIASTOLIC BLOOD PRESSURE: 62 MMHG | BODY MASS INDEX: 25.16 KG/M2 | WEIGHT: 151 LBS

## 2019-11-14 PROBLEM — Z23 NEED FOR TDAP VACCINATION: Status: RESOLVED | Noted: 2019-07-25 | Resolved: 2019-11-14

## 2019-12-16 ENCOUNTER — PATIENT MESSAGE (OUTPATIENT)
Dept: OBGYN CLINIC | Age: 29
End: 2019-12-16

## 2019-12-17 DIAGNOSIS — Z12.83 SKIN EXAM, SCREENING FOR CANCER: Primary | ICD-10-CM

## 2020-04-03 ENCOUNTER — TELEPHONE (OUTPATIENT)
Dept: DERMATOLOGY | Age: 30
End: 2020-04-03

## 2020-04-03 NOTE — TELEPHONE ENCOUNTER
Patient states calling back to see if she needs to schedule an appointment since Joseph Colon (PCP) called her and left message to do so. She called back and I told her at this time Dermatology is only doing VV visits due to the COVID-19 timeframe. I did offer her to schedule a VV visit but she declined because she did not feel comfortable to show the area of concern over video. She states she is worried about her skin concern to be possibly cancer. I spoke to my manager Amado Matthew and we agreed to give patient the option to schedule a VV visit or she would have to wait till this pandemic is over to schedule an office visit.

## 2020-07-10 ENCOUNTER — OFFICE VISIT (OUTPATIENT)
Dept: PRIMARY CARE CLINIC | Age: 30
End: 2020-07-10
Payer: MEDICARE

## 2020-07-10 VITALS
OXYGEN SATURATION: 96 % | TEMPERATURE: 98.1 F | WEIGHT: 148 LBS | SYSTOLIC BLOOD PRESSURE: 106 MMHG | DIASTOLIC BLOOD PRESSURE: 68 MMHG | RESPIRATION RATE: 18 BRPM | HEART RATE: 68 BPM | BODY MASS INDEX: 24.66 KG/M2 | HEIGHT: 65 IN

## 2020-07-10 PROCEDURE — 99395 PREV VISIT EST AGE 18-39: CPT | Performed by: NURSE PRACTITIONER

## 2020-07-10 ASSESSMENT — PATIENT HEALTH QUESTIONNAIRE - PHQ9
SUM OF ALL RESPONSES TO PHQ9 QUESTIONS 1 & 2: 0
SUM OF ALL RESPONSES TO PHQ QUESTIONS 1-9: 0
SUM OF ALL RESPONSES TO PHQ QUESTIONS 1-9: 0
1. LITTLE INTEREST OR PLEASURE IN DOING THINGS: 0
2. FEELING DOWN, DEPRESSED OR HOPELESS: 0

## 2020-07-10 ASSESSMENT — ENCOUNTER SYMPTOMS
COUGH: 0
TROUBLE SWALLOWING: 0
VOMITING: 0
DIARRHEA: 0
NAUSEA: 0
CONSTIPATION: 0
ABDOMINAL PAIN: 0
SORE THROAT: 0
BLOOD IN STOOL: 0
WHEEZING: 0
SHORTNESS OF BREATH: 0
SINUS PRESSURE: 0

## 2020-07-10 NOTE — PROGRESS NOTES
[address] [facility] PRIMARY CARE  [facility] [facility]  [address] [address]  Dept: [phone]  Dept Fax: [phone]    Juliette Sharp is a 30 y.o. female who presentstoday for her medical conditions/complaints as noted below.   Juliette Sharp is c/o of  Chief Complaint   Patient presents with    Annual Exam       HPI:     Here today for annual physical  Since her last visit she has had her 4th child  She has been doing well since delivery but reports single episode of waking at night with pain in her chest  Has attributed this to stomach acid, has not recurred  She exercises regularly with yoga, walking and running  Tries to follow healthy diet    Reports was referred to derm by her OBGYN for a suddenly appearing mole on her vaginal area  She has not heard from them yet and has not scheduled any appointment, is asking for phone number and wondering if she may need new referral  Denies any bleeding or itching from the area      No results found for: LABA1C          ( goal A1C is < 7)   No results found for: LABMICR  No results found for: LDLCHOLESTEROL, LDLCALC    (goal LDL is <100)   BUN (mg/dL)   Date Value   2014 13     BP Readings from Last 3 Encounters:   07/10/20 106/68   19 107/62   10/09/19 114/69          ([id number])    Past Medical History:   Diagnosis Date    Constipation     and diarrhea - since first delivery    Elective  ,     Elevated homocysteine ([address] 2009    Fracture phalanges, hand age 19    Fx navicular, foot-closed age 13    Hemorrhoids     after delivery #1    Hx of postpartum depression, currently pregnant     Hx of seasonal allergies     previously taking Nasonex      Past Surgical History:   Procedure Laterality Date    INDUCED   ,     WISDOM TOOTH EXTRACTION         Family History   Problem Relation Age of Onset    Heart Disease Maternal Grandmother     Stroke Maternal Grandmother  Hypertension Maternal Grandmother     Stroke Maternal Grandfather     Heart Attack Maternal Grandfather     Hypertension Maternal Grandfather     Alcohol Abuse Father     Depression Father    [place] Migraines Mother     Other Mother         elevated homocysteine level    Heart Disease Maternal Aunt     Hypertension Maternal Aunt     Heart Disease Maternal Uncle     Hypertension Maternal Uncle           Social History     Tobacco Use    Smoking status: Former Smoker     Packs/day: 0.25     Years: 3.00     Pack years: 0.75     Last attempt to quit: 2009     Years since quittin.5    Smokeless tobacco: Never Used   Substance Use Topics    Alcohol use: No     Alcohol/week: 0.0 standard drinks      Current Outpatient Medications   Medication Sig Dispense Refill    ibuprofen (ADVIL;MOTRIN) 600 MG tablet Take 1 tablet by mouth every 6 hours as needed for Pain 30 tablet 0    Evening Primrose Oil 1000 MG CAPS Take by mouth      Misc. Devices (BREAST PUMP) MISC 1 Pump by Does not apply route as needed (lactating mother) 1 each 0    MAGNESIUM PO Take 325 mg by mouth daily      b complex vitamins capsule Take 1 capsule by mouth daily      vitamin D (CHOLECALCIFEROL) 1000 UNIT TABS tablet Take 1 tablet by mouth daily      Ferrous Sulfate (IRON) 325 (65 Fe) MG TABS Take 325 mg by mouth daily      PRENAT VIT-FE FUM-FA-FISH OIL PO Take 1 tablet by mouth daily       No current facility-administered medications for this visit.       Allergies   Allergen Reactions    No Known Allergies        Health Maintenance   Topic Date Due    Varicella vaccine (1 of 2 - 2-dose childhood series) 1989    Cervical cancer screen  2020    Flu vaccine (1) 2020    DTaP/Tdap/Td vaccine (2 - Td) 2026    HIV screen  Completed    Hepatitis A vaccine  Aged Out    Hepatitis B vaccine  Aged Out    Hib vaccine  Aged Out    Meningococcal (ACWY) vaccine  Aged Out    Pneumococcal 0-64 years Vaccine Aged Out       Subjective:      Review of Systems   Constitutional: Negative for activity change, appetite change, chills, fatigue, fever and unexpected weight change. HENT: Negative for congestion, ear pain, hearing loss, sinus pressure, sore throat and trouble swallowing. Eyes: Negative for visual disturbance. Respiratory: Negative for cough, shortness of breath and wheezing. Cardiovascular: Negative for chest pain, palpitations and leg swelling. Gastrointestinal: Negative for abdominal pain, blood in stool, constipation, diarrhea, nausea and vomiting. Endocrine: Negative for cold intolerance, heat intolerance, polydipsia, polyphagia and polyuria. Genitourinary: Negative for difficulty urinating, frequency, hematuria and urgency. Musculoskeletal: Negative for arthralgias and myalgias. Skin: Negative for rash. Allergic/Immunologic: Negative for environmental allergies. Neurological: Negative for dizziness, weakness, light-headedness and headaches. Psychiatric/Behavioral: Negative for confusion. The patient is not nervous/anxious. Objective:     Physical Exam  Constitutional:       Appearance: She is well-developed. HENT:      Head: Normocephalic. Eyes:      Conjunctiva/sclera: Conjunctivae normal.      Pupils: Pupils are equal, round, and reactive to light. Neck:      Musculoskeletal: Normal range of motion. Cardiovascular:      Rate and Rhythm: Normal rate and regular rhythm. Heart sounds: Normal heart sounds. No murmur. Pulmonary:      Effort: Pulmonary effort is normal.      Breath sounds: Normal breath sounds. No wheezing. Abdominal:      General: Bowel sounds are normal. There is no distension. Palpations: Abdomen is soft. Musculoskeletal: Normal range of motion. Skin:     General: Skin is warm and dry. Neurological:      Mental Status: She is alert and oriented to person, place, and time.    Psychiatric:         Behavior: Behavior normal. Thought Content: Thought content normal.         Judgment: Judgment normal.       /68   Pulse 68   Temp 98.1 °F (36.7 °C)   Resp 18   Ht 5' 5\" (1.651 m)   Wt 148 lb (67.1 kg)   SpO2 96%   Breastfeeding No   BMI 24.63 kg/m²     Assessment:       Diagnosis Orders   1. Annual physical exam     2. Screening for thyroid disorder  TSH without Reflex   3. Screening, anemia, deficiency, iron  CBC Auto Differential   4. Screening for cardiovascular condition  Lipid Panel   5. Screening for diabetes mellitus  Comprehensive Metabolic Panel   6. Heartburn     7. Atypical nevus of labia majora               Plan:      Return in about 1 year (around 7/10/2021), or if symptoms worsen or fail to improve. Orders Placed This Encounter   Procedures    CBC Auto Differential     Standing Status:   Future     Standing Expiration Date:   7/10/2021    Comprehensive Metabolic Panel     Standing Status:   Future     Standing Expiration Date:   7/10/2021    Lipid Panel     Standing Status:   Future     Standing Expiration Date:   7/10/2021     Order Specific Question:   Is Patient Fasting?/# of Hours     Answer:   8    TSH without Reflex     Standing Status:   Future     Standing Expiration Date:   7/10/2021     Annual exam-screening labs ordered, reviewed healthy diet choices and encouraged to continue with her regular exercise routine  Heartburn- single episode, advised to monitor and if happens on persistent basis to try over-the-counter PPI. If persists call/return office for evaluation. Identify and avoid trigger foods and beverages  Atypical nevus- referred to dermatology per her OB/GYN, provided with information to schedule appointment   Patient given educational materials - see patient instructions. Discussed use, benefit, and side effects of prescribed medications. All patientquestions answered. Pt voiced understanding. Reviewed health maintenance. Instructedto continue current medications, diet and exercise. Patient agreed with treatmentplan. Follow up as directed.      Electronicallysigned by LEOLA Mazariegos CNP on 7/10/2020 at 10:11 AM

## 2021-01-14 ENCOUNTER — OFFICE VISIT (OUTPATIENT)
Dept: PRIMARY CARE CLINIC | Age: 31
End: 2021-01-14
Payer: MEDICARE

## 2021-01-14 ENCOUNTER — HOSPITAL ENCOUNTER (OUTPATIENT)
Age: 31
Setting detail: SPECIMEN
Discharge: HOME | End: 2021-01-14
Payer: MEDICARE

## 2021-01-14 VITALS
HEART RATE: 87 BPM | DIASTOLIC BLOOD PRESSURE: 71 MMHG | HEIGHT: 65 IN | BODY MASS INDEX: 24.66 KG/M2 | SYSTOLIC BLOOD PRESSURE: 107 MMHG | OXYGEN SATURATION: 98 % | TEMPERATURE: 98.2 F | WEIGHT: 148 LBS | RESPIRATION RATE: 16 BRPM

## 2021-01-14 DIAGNOSIS — J02.9 SORE THROAT: ICD-10-CM

## 2021-01-14 DIAGNOSIS — J32.9 SINUSITIS, UNSPECIFIED CHRONICITY, UNSPECIFIED LOCATION: ICD-10-CM

## 2021-01-14 DIAGNOSIS — J02.9 SORE THROAT: Primary | ICD-10-CM

## 2021-01-14 DIAGNOSIS — R09.89 RUNNY NOSE: ICD-10-CM

## 2021-01-14 PROCEDURE — G8427 DOCREV CUR MEDS BY ELIG CLIN: HCPCS | Performed by: PHYSICIAN ASSISTANT

## 2021-01-14 PROCEDURE — 99203 OFFICE O/P NEW LOW 30 MIN: CPT | Performed by: PHYSICIAN ASSISTANT

## 2021-01-14 PROCEDURE — G8484 FLU IMMUNIZE NO ADMIN: HCPCS | Performed by: PHYSICIAN ASSISTANT

## 2021-01-14 PROCEDURE — 1036F TOBACCO NON-USER: CPT | Performed by: PHYSICIAN ASSISTANT

## 2021-01-14 PROCEDURE — G8420 CALC BMI NORM PARAMETERS: HCPCS | Performed by: PHYSICIAN ASSISTANT

## 2021-01-14 PROCEDURE — 87880 STREP A ASSAY W/OPTIC: CPT | Performed by: PHYSICIAN ASSISTANT

## 2021-01-14 RX ORDER — PREDNISONE 20 MG/1
20 TABLET ORAL 2 TIMES DAILY
Qty: 10 TABLET | Refills: 0 | Status: SHIPPED | OUTPATIENT
Start: 2021-01-14 | End: 2021-01-19

## 2021-01-14 ASSESSMENT — ENCOUNTER SYMPTOMS
SINUS PRESSURE: 1
CHEST TIGHTNESS: 0
SWOLLEN GLANDS: 0
HOARSE VOICE: 0
COUGH: 1
SHORTNESS OF BREATH: 0
EYES NEGATIVE: 1
SINUS PAIN: 1
GASTROINTESTINAL NEGATIVE: 1
RHINORRHEA: 1
SORE THROAT: 1

## 2021-01-14 NOTE — LETTER
[address]  [facility] [address]  Phone: [phone]  Fax: [phone]    Giovanni Dubois        January 14, 2021     Patient: Jessica Irwin   YOB: 1990   Date of Visit: 1/14/2021       To Whom it May Concern:    Radha Amaral was seen in my clinic on 1/14/2021. She is to remain off work until her [place] comes back negative    If you have any questions or concerns, please don't hesitate to call.     Sincerely,         Arron Morocho PA-C

## 2021-01-14 NOTE — PROGRESS NOTES
[address] [place] [address]  [address] [address]  Phone: [phone]  Fax: [address]    Pt Name: Servando Mo  MRN: [id number]  [place] 1990  Date of evaluation: 1/14/2021  Provider: Vidhya Sorenson, [facility]       Chief Complaint   Patient presents with    Pharyngitis     symptoms started 1/13/2021    Congestion    Cough           HISTORY OF PRESENT ILLNESS  (Location/Symptom, Timing/Onset, Context/Setting, Quality, Duration, Modifying Factors, Severity.)   Servando Mo is a 26 y.o. White [1] female who presents to the office for evaluation of      Sinusitis  This is a new problem. The current episode started in the past 7 days. The problem has been gradually worsening since onset. There has been no fever. Her pain is at a severity of 3/10. The pain is mild. Associated symptoms include congestion, coughing, ear pain, headaches, sinus pressure and a sore throat. Pertinent negatives include no chills, diaphoresis, hoarse voice, neck pain, shortness of breath, sneezing or swollen glands. Past treatments include acetaminophen. Nursing Notes were reviewed. REVIEW OF SYSTEMS    (2-9 systems for level 4, 10 or more for level 5)     Review of Systems   Constitutional: Negative for chills, diaphoresis, fatigue and fever. HENT: Positive for congestion, ear pain, postnasal drip, rhinorrhea, sinus pressure, sinus pain and sore throat. Negative for ear discharge, hoarse voice and sneezing. Eyes: Negative. Respiratory: Positive for cough. Negative for chest tightness and shortness of breath. Cardiovascular: Negative. Gastrointestinal: Negative. Genitourinary: Negative. Musculoskeletal: Negative for neck pain. Neurological: Positive for headaches. Except as noted above the remainder of the review of systems was reviewed andnegative. PAST MEDICAL HISTORY   History reviewed. Past Medical History:   Diagnosis Date    Constipation     and diarrhea - since first delivery    Elective  ,     Elevated homocysteine 2009    Fracture phalanges, hand age 19    Fx navicular, foot-closed age 13    Hemorrhoids     after delivery #1    Hx of postpartum depression, currently pregnant     Hx of seasonal allergies     previously taking Nasonex         SURGICAL HISTORY     History reviewed. Past Surgical History:   Procedure Laterality Date    INDUCED   ,     WISDOM TOOTH EXTRACTION           CURRENT MEDICATIONS       Current Outpatient Medications   Medication Sig Dispense Refill    predniSONE (DELTASONE) 20 MG tablet Take 1 tablet by mouth 2 times daily for 5 days 10 tablet 0    ibuprofen (ADVIL;MOTRIN) 600 MG tablet Take 1 tablet by mouth every 6 hours as needed for Pain 30 tablet 0    Evening Primrose Oil 1000 MG CAPS Take by mouth      Misc. Devices (BREAST PUMP) MISC 1 Pump by Does not apply route as needed (lactating mother) 1 each 0    MAGNESIUM PO Take 325 mg by mouth daily      b complex vitamins capsule Take 1 capsule by mouth daily      vitamin D (CHOLECALCIFEROL) 1000 UNIT TABS tablet Take 1 tablet by mouth daily      Ferrous Sulfate (IRON) 325 (65 Fe) MG TABS Take 325 mg by mouth daily      PRENAT VIT-FE FUM-FA-FISH OIL PO Take 1 tablet by mouth daily       No current facility-administered medications for this visit.           ALLERGIES     No known allergies    FAMILY HISTORY           Problem Relation Age of Onset    Heart Disease Maternal Grandmother     Stroke Maternal Grandmother     Hypertension Maternal Grandmother     Stroke Maternal Grandfather     Heart Attack Maternal Grandfather     Hypertension Maternal Grandfather     Alcohol Abuse Father     Depression Father     Migraines Mother     Other Mother         elevated homocysteine level    Heart Disease Maternal Aunt     Hypertension Maternal Aunt     Heart Disease Maternal Uncle     Hypertension Maternal Uncle      Family Status   Relation Name Status    MGM      MGF      Father  Other    Mother  Alive   Saritha Holley  (Not Specified)    Teetee  (Not Specified)          SOCIAL HISTORY      reports that she quit smoking about 12 years ago. She has a 0.75 pack-year smoking history. She has never used smokeless tobacco. She reports that she does not drink alcohol or use drugs. PHYSICAL EXAM    (up to 7 for level 4, 8 or more for level 5)     Vitals:    21 1342   BP: 107/71   Pulse: 87   Resp: 16   Temp: 98.2 °F (36.8 °C)   TempSrc: Temporal   SpO2: 98%   Weight: 148 lb (67.1 kg)   Height: 5' 5\" (1.651 m)         Physical Exam  Vitals signs and nursing note reviewed. Constitutional:       Appearance: Normal appearance. HENT:      Head: Normocephalic and atraumatic. Right Ear: External ear normal.      Left Ear: External ear normal.      Nose: Congestion and rhinorrhea present. Right Sinus: Maxillary sinus tenderness and frontal sinus tenderness present. Left Sinus: Maxillary sinus tenderness and frontal sinus tenderness present. Mouth/Throat:      Mouth: Mucous membranes are moist.   Eyes:      Extraocular Movements: Extraocular movements intact. Conjunctiva/sclera: Conjunctivae normal.      Pupils: Pupils are equal, round, and reactive to light. Cardiovascular:      Rate and Rhythm: Normal rate and regular rhythm. Pulses: Normal pulses. Pulmonary:      Effort: Pulmonary effort is normal.   Abdominal:      Palpations: Abdomen is soft. Skin:     General: Skin is warm and dry. Neurological:      Mental Status: She is alert and oriented to person, place, and time. DIFFERENTIAL DIAGNOSIS:       Marla Anne reviewed the disposition diagnosis with the patient and or their family/guardian. I have answered their questions and given discharge instructions.   They voiced understanding of these instructions and did not have anyfurther questions or complaints. PROCEDURES:  Orders Placed This Encounter   Procedures    COVID-19 Ambulatory     Standing Status:   Future     Standing Expiration Date:   1/14/2022     Scheduling Instructions:      Saline media preferred given current shortage of viral transport media but both acceptable     Order Specific Question:   Is this test for diagnosis or screening? Answer:   Diagnosis of ill patient     Order Specific Question:   Symptomatic for COVID-19 as defined by CDC? Answer:   Yes     Order Specific Question:   Date of Symptom Onset     Answer:   1/11/2021     Order Specific Question:   Hospitalized for COVID-19? Answer:   No     Order Specific Question:   Admitted to ICU for COVID-19? Answer:   No     Order Specific Question:   Employed in healthcare setting? Answer:   Unknown     Order Specific Question:   Resident in a congregate (group) care setting? Answer:   Unknown     Order Specific Question:   Pregnant? Answer:   Unknown     Order Specific Question:   Previously tested for COVID-19? Answer:   Unknown    POCT rapid strep A       No results found for this visit on 01/14/21. FINALIMPRESSION      Visit Diagnoses and Associated Orders     Sore throat    -  Primary    COVID-19 Ambulatory [[id number] Custom]   - Future Order    POCT rapid strep A [[id number] Custom]           Runny nose        COVID-19 Ambulatory [[id number] Custom]   - Future Order    POCT rapid strep A [[id number] Custom]           Sinusitis, unspecified chronicity, unspecified location        COVID-19 Ambulatory [[id number] Custom]   - Future Order    POCT rapid strep A [[id number] Custom]           ORDERS WITHOUT AN ASSOCIATED DIAGNOSIS    predniSONE (DELTASONE) 20 MG tablet [[id number]]              PLAN     Return if symptoms worsen or fail to improve.       DISCHARGEMEDICATIONS:  Orders Placed This Encounter   Medications    predniSONE (DELTASONE) 20 MG tablet     Sig: Take 1 tablet by mouth 2 times daily for 5 days     Dispense:  10 tablet     Refill:  0         Plan:  Specimen sent for a culture. Possible treatment alteration based on the results. I believe that this is likely a viral illness based on the physical exam findings. Tylenol/Motrin for fever/discomfort. Patient agreeable to treatment plan. Educational materials provided on AVS.  Follow up if symptoms do not improve/worsen. Advance Care Planning  People with COVID-19 may have no symptoms, mild symptoms, such as fever, cough, and shortness of breath or they may have more severe illness, developing severe and fatal pneumonia. As a result, Advance Care Planning with attention to naming a health care decision maker (someone you trust to make healthcare decisions for you if you could not speak for yourself) and sharing other health care preferences is important BEFORE a possible health crisis. Please contact your Primary Care Provider to discuss Advance Care Planning. Preventing the Spread of Coronavirus Disease 2019 in Homes and Residential Communities  For the most recent information go to [email or web address]    Prevention steps for People with confirmed or suspected COVID-19 (including persons under investigation) who do not need to be hospitalized  and   People with confirmed COVID-19 who were hospitalized and determined to be medically stable to go home    Your healthcare provider and public health staff will evaluate whether you can be cared for at home. If it is determined that you do not need to be hospitalized and can be isolated at home, you will be monitored by staff from your local or state health department. You should follow the prevention steps below until a healthcare provider or local or state health department says you can return to your normal activities.   Stay home except to get medical care  People who are mildly ill with COVID-19 are able to isolate at home during their illness. You should restrict activities outside your home, except for getting medical care. Do not go to work, school, or public areas. Avoid using public transportation, ride-sharing, or taxis. Separate yourself from other people and animals in your home  People: As much as possible, you should stay in a specific room and away from other people in your home. Also, you should use a separate bathroom, if available. Animals: You should restrict contact with pets and other animals while you are sick with COVID-19, just like you would around other people. Although there have not been reports of pets or other animals becoming sick with COVID-19, it is still recommended that people sick with COVID-19 limit contact with animals until more information is known about the virus. When possible, have another member of your household care for your animals while you are sick. If you are sick with COVID-19, avoid contact with your pet, including petting, snuggling, being kissed or licked, and sharing food. If you must care for your pet or be around animals while you are sick, wash your hands before and after you interact with pets and wear a facemask. Call ahead before visiting your doctor  If you have a medical appointment, call the healthcare provider and tell them that you have or may have COVID-19. This will help the healthcare providers office take steps to keep other people from getting infected or exposed. Wear a facemask  You should wear a facemask when you are around other people (e.g., sharing a room or vehicle) or pets and before you enter a healthcare providers office. If you are not able to wear a facemask (for example, because it causes trouble breathing), then people who live with you should not stay in the same room with you, or they should wear a facemask if they enter your room. Cover your coughs and sneezes  Cover your mouth and nose with a tissue when you cough or sneeze.  Throw used tissues in a lined trash can. Immediately wash your hands with soap and water for at least 20 seconds or, if soap and water are not available, clean your hands with an alcohol-based hand  that contains at least 60% alcohol. Clean your hands often  Wash your hands often with soap and water for at least 20 seconds, especially after blowing your nose, coughing, or sneezing; going to the bathroom; and before eating or preparing food. If soap and water are not readily available, use an alcohol-based hand  with at least 60% alcohol, covering all surfaces of your hands and rubbing them together until they feel dry. Soap and water are the best option if hands are visibly dirty. Avoid touching your eyes, nose, and mouth with unwashed hands. Avoid sharing personal household items  You should not share dishes, drinking glasses, cups, eating utensils, towels, or bedding with other people or pets in your home. After using these items, they should be washed thoroughly with soap and water. Clean all high-touch surfaces everyday  High touch surfaces include counters, tabletops, doorknobs, bathroom fixtures, toilets, phones, keyboards, tablets, and bedside tables. Also, clean any surfaces that may have blood, stool, or body fluids on them. Use a household cleaning spray or wipe, according to the label instructions. Labels contain instructions for safe and effective use of the cleaning product including precautions you should take when applying the product, such as wearing gloves and making sure you have good ventilation during use of the product. Monitor your symptoms  Seek prompt medical attention if your illness is worsening (e.g., difficulty breathing). Before seeking care, call your healthcare provider and tell them that you have, or are being evaluated for, COVID-19. Put on a facemask before you enter the facility.  These steps will help the healthcare providers office to keep other people in the office or waiting room from getting infected or exposed. Ask your healthcare provider to call the local or state health department. Persons who are placed under active monitoring or facilitated self-monitoring should follow instructions provided by their local health department or occupational health professionals, as appropriate. When working with your local health department check their available hours. If you have a medical emergency and need to call 911, notify the dispatch personnel that you have, or are being evaluated for COVID-19. If possible, put on a facemask before emergency medical services arrive. Discontinuing home isolation  Patients with confirmed COVID-19 should remain under home isolation precautions until the risk of secondary transmission to others is thought to be low. The decision to discontinue home isolation precautions should be made on a case-by-case basis, in consultation with healthcare providers and state and local health departments. Patient instructed to return to the office if symptoms worsen, return, or have any other concerns. Patient understands and is agreeable.          Tiffanie Monroe PA-C 1/14/2021 2:23 PM

## 2021-01-14 NOTE — PATIENT INSTRUCTIONS
Patient Education        Sinusitis: Care Instructions  Your Care Instructions     Sinusitis is an infection of the lining of the sinus cavities in your head. Sinusitis often follows a cold. It causes pain and pressure in your head and face. In most cases, sinusitis gets better on its own in 1 to 2 weeks. But some mild symptoms may last for several weeks. Sometimes antibiotics are needed. Follow-up care is a key part of your treatment and safety. Be sure to make and go to all appointments, and call your doctor if you are having problems. It's also a good idea to know your test results and keep a list of the medicines you take. How can you care for yourself at home? · Take an over-the-counter pain medicine, such as acetaminophen (Tylenol), ibuprofen (Advil, Motrin), or naproxen (Aleve). Read and follow all instructions on the label. · If the doctor prescribed antibiotics, take them as directed. Do not stop taking them just because you feel better. You need to take the full course of antibiotics. · Be careful when taking over-the-counter cold or flu medicines and Tylenol at the same time. Many of these medicines have acetaminophen, which is Tylenol. Read the labels to make sure that you are not taking more than the recommended dose. Too much acetaminophen (Tylenol) can be harmful. · Breathe warm, moist air from a steamy shower, a hot bath, or a sink filled with hot water. Avoid cold, dry air. Using a humidifier in your home may help. Follow the directions for cleaning the machine. · Use saline (saltwater) nasal washes to help keep your nasal passages open and wash out mucus and bacteria. You can buy saline nose drops at a grocery store or drugstore. Or you can make your own at home by adding 1 teaspoon of salt and 1 teaspoon of baking soda to 2 cups of distilled water. If you make your own, fill a bulb syringe with the solution, insert the tip into your nostril, and squeeze gently. Nova Shank your nose.   · Put a sure to make and go to all appointments, and call your doctor if you are having problems. It's also a good idea to know your test results and keep a list of the medicines you take. How can you care for yourself at home? · You can buy premixed saline solution in a squeeze bottle or other sinus rinse products at a drugstore. Read and follow the instructions on the label. · You also can make your own saline solution by adding 1 teaspoon of salt and 1 teaspoon of baking soda to 2 cups of distilled water. · If you use a homemade solution, pour a small amount into a clean bowl. Using a rubber bulb syringe, squeeze the syringe and place the tip in the salt water. Pull a small amount of the salt water into the syringe by relaxing your hand. · Sit down with your head tilted slightly back. Do not lie down. Put the tip of the bulb syringe or the squeeze bottle a little way into one of your nostrils. Gently drip or squirt a few drops into the nostril. Repeat with the other nostril. Some sneezing and gagging are normal at first.  · Gently blow your nose. · Wipe the syringe or bottle tip clean after each use. · Repeat this 2 or 3 times a day. · Use nasal washes gently if you have nosebleeds often. When should you call for help? Watch closely for changes in your health, and be sure to contact your doctor if:    · You often get nosebleeds.     · You have problems doing the nasal washes. Where can you learn more? Go to https://TwtBksnikoel.eTobb. org and sign in to your Dextrys account. Enter [phone] in the [facility] box to learn more about \"Saline Nasal Washes: Care Instructions. \"     If you do not have an account, please click on the \"Sign Up Now\" link. Current as of: April 15, 2020               Content Version: 12.6  © 5712-6390 [employer] Incorporated. Care instructions adapted under license by [facility] ([facility]).  If you have questions about a medical condition or this instruction, always ask These medicines help the immune system fight COVID-19. One example is bamlanivimab. It's a monoclonal antibody. Blood thinners. These medicines help prevent blood clots. People with severe illness are at risk for blood clots. How can you protect yourself and others? The best way to protect yourself from getting sick is to:  · Avoid areas where there is an outbreak. · Avoid contact with people who may be infected. · Avoid crowds and try to stay at least 6 feet away from other people. · Wash your hands often, especially after you cough or sneeze. Use soap and water, and scrub for at least 20 seconds. If soap and water aren't available, use an alcohol-based hand . · Avoid touching your mouth, nose, and eyes. To help avoid spreading the virus to others:  · Stay home if you are sick or have been exposed to the virus. Don't go to school, work, or public areas. And don't use public transportation, ride-shares, or taxis unless you have no choice. · Wear a cloth face cover if you have to go to public areas. · Cover your mouth with a tissue when you cough or sneeze. Then throw the tissue in the trash and wash your hands right away. · If you're sick:  ? Leave your home only if you need to get medical care. But call the doctor's office first so they know you're coming. And wear a face cover. ? Wear the face cover whenever you're around other people. It can help stop the spread of the virus when you cough or sneeze. ? Limit contact with pets and people in your home. If possible, stay in a separate bedroom and use a separate bathroom. ? Clean and disinfect your home every day. Use household  and disinfectant wipes or sprays. Take special care to clean things that you grab with your hands. These include doorknobs, remote controls, phones, and handles on your refrigerator and microwave. And don't forget countertops, tabletops, bathrooms, and computer keyboards. When should you call for help? medicines to reduce symptoms, plus breathing support such as oxygen therapy or a ventilator. It's important to not spread the virus to others. If you have COVID-19, wear a face cover anytime you are around other people. You need to isolate yourself while you are sick. Leave your home only if you need to get medical care or testing. Follow-up care is a key part of your treatment and safety. Be sure to make and go to all appointments, and call your doctor if you are having problems. It's also a good idea to know your test results and keep a list of the medicines you take. How can you care for yourself at home? · Get extra rest. It can help you feel better. · Drink plenty of fluids. This helps replace fluids lost from fever. Fluids also help ease a scratchy throat. Water, soup, fruit juice, and hot tea with lemon are good choices. · Take acetaminophen (such as Tylenol) to reduce a fever. It may also help with muscle aches. Read and follow all instructions on the label. · Use petroleum jelly on sore skin. This can help if the skin around your nose and lips becomes sore from rubbing a lot with tissues. Tips for self-isolation  · Limit contact with people in your home. If possible, stay in a separate bedroom and use a separate bathroom. · Wear a cloth face cover when you are around other people. It can help stop the spread of the virus when you cough or sneeze. · If you have to leave home, avoid crowds and try to stay at least 6 feet away from other people. · Avoid contact with pets and other animals. · Cover your mouth and nose with a tissue when you cough or sneeze. Then throw it in the trash right away. · Wash your hands often, especially after you cough or sneeze. Use soap and water, and scrub for at least 20 seconds. If soap and water aren't available, use an alcohol-based hand . · Don't share personal household items.  These include bedding, towels, cups and glasses, and eating utensils. · [address] in the warmest water allowed for the fabric type, and dry it completely. It's okay to wash other people's laundry with yours. · Clean and disinfect your home every day. Use household  and disinfectant wipes or sprays. Take special care to clean things that you grab with your hands. These include doorknobs, remote controls, phones, and handles on your refrigerator and microwave. And don't forget countertops, tabletops, bathrooms, and computer keyboards. When you can end self-isolation  · If you know or suspect that you have COVID-19, stay in self-isolation until:  ? You haven't had a fever for 24 hours while not taking medicines to lower the fever, and  ? Your symptoms have improved, and  ? It's been at least 10 days since your symptoms started. · Talk to your doctor about whether you also need testing, especially if you have a weakened immune system. When should you call for help? Call 911 anytime you think you may need emergency care. For example, call if you have life-threatening symptoms, such as:    · You have severe trouble breathing. (You can't talk at all.)     · You have constant chest pain or pressure.     · You are severely dizzy or lightheaded.     · You are confused or can't think clearly.     · Your face and lips have a blue color.     · You pass out (lose consciousness) or are very hard to wake up. Call your doctor now or seek immediate medical care if:    · You have moderate trouble breathing. (You can't speak a full sentence.)     · You are coughing up blood (more than about 1 teaspoon).     · You have signs of low blood pressure. These include feeling lightheaded; being too weak to stand; and having cold, pale, clammy skin. Watch closely for changes in your health, and be sure to contact your doctor if:    · Your symptoms get worse.     · You are not getting better as expected. Call before you go to the doctor's office. Follow their instructions.  And wear a cloth face cover. Current as of: December 18, 2020               Content Version: 12.7  © 9526-5335 [employer], Incorporated. Care instructions adapted under license by [employer]. If you have questions about a medical condition or this instruction, always ask your healthcare professional. [address] any warranty or liability for your use of this information.

## 2021-01-17 LAB — SARS-COV-2, NAA: NOT DETECTED

## 2021-01-18 ENCOUNTER — TELEPHONE (OUTPATIENT)
Dept: PRIMARY CARE CLINIC | Age: 31
End: 2021-01-18

## 2021-02-19 ENCOUNTER — TELEMEDICINE (OUTPATIENT)
Dept: PRIMARY CARE CLINIC | Age: 31
End: 2021-02-19
Payer: MEDICARE

## 2021-02-19 DIAGNOSIS — N61.0 MASTITIS, ACUTE: Primary | ICD-10-CM

## 2021-02-19 DIAGNOSIS — Z13.31 POSITIVE DEPRESSION SCREENING: ICD-10-CM

## 2021-02-19 DIAGNOSIS — F41.9 ANXIETY: ICD-10-CM

## 2021-02-19 PROCEDURE — G8431 POS CLIN DEPRES SCRN F/U DOC: HCPCS | Performed by: NURSE PRACTITIONER

## 2021-02-19 PROCEDURE — G8427 DOCREV CUR MEDS BY ELIG CLIN: HCPCS | Performed by: NURSE PRACTITIONER

## 2021-02-19 PROCEDURE — 99214 OFFICE O/P EST MOD 30 MIN: CPT | Performed by: NURSE PRACTITIONER

## 2021-02-19 RX ORDER — CEPHALEXIN 500 MG/1
500 CAPSULE ORAL 4 TIMES DAILY
Qty: 28 CAPSULE | Refills: 0 | Status: SHIPPED | OUTPATIENT
Start: 2021-02-19 | End: 2021-02-26

## 2021-02-19 ASSESSMENT — PATIENT HEALTH QUESTIONNAIRE - PHQ9
SUM OF ALL RESPONSES TO PHQ QUESTIONS 1-9: 12
10. IF YOU CHECKED OFF ANY PROBLEMS, HOW DIFFICULT HAVE THESE PROBLEMS MADE IT FOR YOU TO DO YOUR WORK, TAKE CARE OF THINGS AT HOME, OR GET ALONG WITH OTHER PEOPLE: 2
SUM OF ALL RESPONSES TO PHQ QUESTIONS 1-9: 12
2. FEELING DOWN, DEPRESSED OR HOPELESS: 3
5. POOR APPETITE OR OVEREATING: 2
3. TROUBLE FALLING OR STAYING ASLEEP: 3

## 2021-02-19 ASSESSMENT — ENCOUNTER SYMPTOMS
SORE THROAT: 0
VOMITING: 0
RHINORRHEA: 0
NAUSEA: 0
SHORTNESS OF BREATH: 0
ABDOMINAL PAIN: 0
CHEST TIGHTNESS: 0
DIARRHEA: 0
COLOR CHANGE: 0

## 2021-02-19 ASSESSMENT — COLUMBIA-SUICIDE SEVERITY RATING SCALE - C-SSRS
2. HAVE YOU ACTUALLY HAD ANY THOUGHTS OF KILLING YOURSELF?: NO
1. WITHIN THE PAST MONTH, HAVE YOU WISHED YOU WERE DEAD OR WISHED YOU COULD GO TO SLEEP AND NOT WAKE UP?: NO
6. HAVE YOU EVER DONE ANYTHING, STARTED TO DO ANYTHING, OR PREPARED TO DO ANYTHING TO END YOUR LIFE?: NO

## 2021-02-19 NOTE — LETTER
[facility]  [address] [place]  [address]  [facility] [address]  Phone: [phone]  Fax: Martha Herrera, APRN - CNP        2021     Patient: Nacho Cardenas   YOB: 1990   Date of Visit: 2021       To Whom It May Concern: It is my medical opinion that Sheron Parada should be permitted to work from home due to underlying medical condition. If you have any questions or concerns, please don't hesitate to call.     Sincerely,        Lex Mendoza, APRN - CNP

## 2021-02-19 NOTE — PATIENT INSTRUCTIONS
breasts less full and may make it easier for your baby to latch on to your breast.  ? Pump or express milk from the affected breast if it hurts too much to breastfeed. · Take an over-the-counter pain medicine, such as acetaminophen (Tylenol) or ibuprofen (Advil, Motrin) to relieve pain and fever. Read and follow all instructions on the label. · Do not take two or more pain medicines at the same time unless the doctor told you to. Many pain medicines have acetaminophen, which is Tylenol. Too much acetaminophen (Tylenol) can be harmful. · Rest as much as possible. · Drink extra fluids. · If pus is draining from your infected breast, wash the nipple gently and let it air-dry before you put your bra back on. A disposable breast pad placed in the bra cup may absorb the pus. · Sometimes, a blocked nipple pore, called a milk blister (or bleb) happens. This causes milk to back up in the breast. It can cause mastitis, so it's important to treat this if it happens. A milk blister is often a white dot on your nipple that can be painful. If a milk blister is causing you pain, it may help to place a warm, wet washcloth over the blister before breastfeeding or pumping. If this doesn't clear the blockage, your doctor can open the blister using a sterile needle. When should you call for help? Call your doctor now or seek immediate medical care if:    · You have worse symptoms of a breast infection, such as:  ? Increased pain, swelling, redness, or warmth around a breast.  ? Red streaks leading from a breast.  ? Pus draining from a breast.  ? A fever. Watch closely for changes in your health, and be sure to contact your doctor if:    · You do not get better as expected. Where can you learn more? Go to https://Aragon PharmaceuticalspeVoltaic Coatingseweb.BrightDoor Systems. org and sign in to your TeensSuccess account. Enter Y207 in the "[employer]" box to learn more about \"Mastitis: Care Instructions. \"     If you do not have an account, please click on the \"Sign Up Now\" link. Current as of: February 11, 2020               Content Version: 12.6  © 2006-2020 [employer], Incorporated. Care instructions adapted under license by [facility] ([facility]). If you have questions about a medical condition or this instruction, always ask your healthcare professional. [address] any warranty or liability for your use of this information.

## 2021-02-19 NOTE — PROGRESS NOTES
2021    TELEHEALTH EVALUATION -- Audio/Visual (During OCKZG-20 public health emergency)    HPI:    Ada Trevino (:  1990) has requested an audio/video evaluation for the following concern(s):    Here with acute complaint of mastitis to right breast x 1 day  Has noticed breast is hard and tender to touch, also has fever and chills developing in last hour  Feels similar to prior mastitis with previous children  Currently nursing her 13 month old son, has been weaning and nursing mostly at night    Has been increasingly anxious due to returning to work in person after working from home for last year  Her boss told her to get a note from PCP recommending she be allowed to continue working from home, she is concerned about returning because she has not had covid vaccine yet- does not take flu vaccine and is concerned about potential reaction to covid vaccine due to sensitivity to eggs so is waiting for more data  Denies need for medication for anxiety at present, would like to avoid during breastfeeding, works for [employer], is thinking of utilizing services offered to employees if anxiousness persists    Review of Systems   Constitutional: Positive for chills, fatigue and fever. Negative for activity change. Right breast pain, see HPI   HENT: Negative for congestion, rhinorrhea and sore throat. Eyes: Negative for visual disturbance. Respiratory: Negative for chest tightness and shortness of breath. Cardiovascular: Negative for chest pain and palpitations. Gastrointestinal: Negative for abdominal pain, diarrhea, nausea and vomiting. Endocrine: Negative for polydipsia. Genitourinary: Negative for difficulty urinating. Musculoskeletal: Positive for myalgias. Negative for arthralgias. Skin: Negative for color change. Neurological: Negative for weakness and headaches. Psychiatric/Behavioral: Negative for behavioral problems, self-injury, sleep disturbance and suicidal ideas.  The patient is nervous/anxious. All other systems reviewed and are negative. Prior to Visit Medications    Medication Sig Taking? Authorizing Provider   cephALEXin (KEFLEX) 500 MG capsule Take 1 capsule by mouth 4 times daily for 7 days Yes LEOLA Pride - CNP   Misc.  Devices (BREAST PUMP) MIS 1 Pump by Does not apply route as needed (lactating mother) Yes LEOLA Shultz CNM   MAGNESIUM PO Take 325 mg by mouth daily Yes Historical Provider, MD   b complex vitamins capsule Take 1 capsule by mouth daily Yes Historical Provider, MD   vitamin D (CHOLECALCIFEROL) 1000 UNIT TABS tablet Take 1 tablet by mouth daily Yes Historical Provider, MD   Ferrous Sulfate (IRON) 325 (65 Fe) MG TABS Take 325 mg by mouth daily Yes Historical Provider, MD [place] VIT-FE FUM-FA-FISH OIL PO Take 1 tablet by mouth daily Yes Historical Provider, MD   ibuprofen (ADVIL;MOTRIN) 600 MG tablet Take 1 tablet by mouth every 6 hours as needed for Pain  Patient not taking: Reported on 2021  Waldron Ormond, DO   Evening Primrose Oil 1000 MG CAPS Take by mouth  Historical Provider, MD       Social History     Tobacco Use    Smoking status: Former Smoker     Packs/day: 0.25     Years: 3.00     Pack years: 0.75     Quit date: 2009     Years since quittin.1    Smokeless tobacco: Never Used   Substance Use Topics    Alcohol use: No     Alcohol/week: 0.0 standard drinks    Drug use: No          PHYSICAL EXAMINATION:  [ INSTRUCTIONS:  \"[x]\" Indicates a positive item  \"[]\" Indicates a negative item  -- DELETE ALL ITEMS NOT EXAMINED]  Vital Signs: (As obtained by patient/caregiver or practitioner observation)    Blood pressure-  Heart rate-    Respiratory rate-    Temperature-  Pulse oximetry-     Constitutional: [x] Appears well-developed and well-nourished [x] No apparent distress      [x] Abnormal-  Wrapped in blanket, ill appearing  Mental status  [x] Alert and awake  [x] Oriented to person/place/time [x]Able to follow commands      Eyes:  EOM    []  Normal  [] Abnormal-  Sclera  []  Normal  [] Abnormal -          Discharge []  None visible  [] Abnormal -    HENT:   [x] Normocephalic, atraumatic. [] Abnormal   [] Mouth/Throat: Mucous membranes are moist.     External Ears [] Normal  [] Abnormal-     Neck: [x] No visualized mass     Pulmonary/Chest: [x] Respiratory effort normal.  [x] No visualized signs of difficulty breathing or respiratory distress        [] Abnormal-      Musculoskeletal:   [x] Normal gait with no signs of ataxia         [] Normal range of motion of neck        [] Abnormal-       Neurological:        [] No Facial Asymmetry (Cranial nerve 7 motor function) (limited exam to video visit)          [] No gaze palsy        [] Abnormal-         Skin:        [] No significant exanthematous lesions or discoloration noted on facial skin         [x] Abnormal- limited by virtual visit, slight redness noted to top of right breast above nipple, pt had low cut top on which allowed adequate visualization of affected area without exposing entire breast         Psychiatric:       [x] Normal Affect [] No Hallucinations        [] Abnormal-     Other pertinent observable physical exam findings-     ASSESSMENT/PLAN:  1. Mastitis, acute  New, will treat with keflex 4 times daily for 7 days. Continue to monitor for worsening symptoms, no concern for covid but if does not improve, may consider concurrent viral illness. Tylenol PRn for pain, warm compress and supportive care. - cephALEXin (KEFLEX) 500 MG capsule; Take 1 capsule by mouth 4 times daily for 7 days  Dispense: 28 capsule; Refill: 0    2. Anxiety  New, situational regarding returning to work in office, will explore counseling options through work.   Letter given per pt request to continue to work from home, discussed that she does not meet criteria for functional limitation or disability per se, so would not be able to certify something like that if employer requests, she understands. Discussed at length, she notes she feels better just after discussing it and getting letter. Denies depression symptoms, no SI/HI. Return if symptoms worsen or fail to improve. Fatmata Workman is a 26 y.o. female being evaluated by a Virtual Visit (video visit) encounter to address concerns as mentioned above. A caregiver was present when appropriate. Due to this being a TeleHealth encounter (During XLEXW-49 public health emergency), evaluation of the following organ systems was limited: Vitals/Constitutional/EENT/Resp/CV/GI//MS/Neuro/Skin/Heme-Lymph-Imm. Pursuant to the emergency declaration under the [address] [place] [address] authority and the [employer] and [employer] Act, this Virtual Visit was conducted with patient's (and/or legal guardian's) consent, to reduce the patient's risk of exposure to COVID-19 and provide necessary medical care. The patient (and/or legal guardian) has also been advised to contact this office for worsening conditions or problems, and seek emergency medical treatment and/or call 911 if deemed necessary. Patient identification was verified at the start of the visit: Yes    Total time spent on this encounter: Not billed by time    Services were provided through a video synchronous discussion virtually to substitute for in-person clinic visit. Patient and provider were located at their individual homes. --LEOLA White - CNP on 2/19/2021 at 5:27 PM    An electronic signature was used to authenticate this note. On the basis of positive PHQ-9 screening (PHQ-9 Total Score: 12), the following plan was implemented: patient declines further evaluation/treatment for depression. Patient will follow-up in PRN with PCP.  Advised to f/u with counseling service at work, offered medication and pt declined at present, discussed SSRI ok during breastfeeding and she would like to wait until son no longer breastfeeding. If gets worse, will consider sooner.

## 2021-02-22 ENCOUNTER — NURSE ONLY (OUTPATIENT)
Dept: PRIMARY CARE CLINIC | Age: 31
End: 2021-02-22

## 2021-02-22 ENCOUNTER — TELEPHONE (OUTPATIENT)
Dept: PRIMARY CARE CLINIC | Age: 31
End: 2021-02-22

## 2021-02-22 ENCOUNTER — HOSPITAL ENCOUNTER (OUTPATIENT)
Age: 31
Setting detail: SPECIMEN
Discharge: HOME | End: 2021-02-22
Payer: MEDICARE

## 2021-02-22 DIAGNOSIS — R50.9 FEVER AND CHILLS: ICD-10-CM

## 2021-02-22 DIAGNOSIS — Z20.822 ENCOUNTER FOR LABORATORY TESTING FOR COVID-19 VIRUS: Primary | ICD-10-CM

## 2021-02-22 DIAGNOSIS — N61.0 MASTITIS: Primary | ICD-10-CM

## 2021-02-22 RX ORDER — SULFAMETHOXAZOLE AND TRIMETHOPRIM 800; 160 MG/1; MG/1
1 TABLET ORAL 2 TIMES DAILY
Qty: 14 TABLET | Refills: 0 | Status: SHIPPED | OUTPATIENT
Start: 2021-02-22 | End: 2021-03-01

## 2021-02-22 NOTE — TELEPHONE ENCOUNTER
Per Recife : \"Please have her discontinue use of the keflex and start the bactrim I just sent in for her, sounds like her mastitis is not responding.  I have ordered covid swab to rule that out also. If symptoms persist, I recommend she be seen in person to evalua te breast. \"    Patient notified and verbalized understanding.  Writer provided phone number to [facility] to inquire as to their process of getting tested for COVID-19

## 2021-02-22 NOTE — TELEPHONE ENCOUNTER
Patient calling stating that she is no better since her virtual visit with you last Friday, wondering if you can please order covid swab for her.  Please advise

## 2021-02-22 NOTE — PROGRESS NOTES
Pt presented with order from PCP for COVID-19 testing. Sample collected by Angel Fish MA and sent to the lab.

## 2021-02-23 DIAGNOSIS — R50.9 FEVER AND CHILLS: ICD-10-CM

## 2021-02-24 LAB
SARS-COV-2: NORMAL
SARS-COV-2: NOT DETECTED
SOURCE: NORMAL

## 2021-03-18 ENCOUNTER — HOSPITAL ENCOUNTER (OUTPATIENT)
Age: 31
Setting detail: SPECIMEN
Discharge: HOME | End: 2021-03-18
Payer: MEDICARE

## 2021-03-18 ENCOUNTER — OFFICE VISIT (OUTPATIENT)
Dept: OBGYN CLINIC | Age: 31
End: 2021-03-18
Payer: MEDICARE

## 2021-03-18 VITALS
WEIGHT: 142.4 LBS | TEMPERATURE: 97.1 F | BODY MASS INDEX: 25.23 KG/M2 | DIASTOLIC BLOOD PRESSURE: 78 MMHG | SYSTOLIC BLOOD PRESSURE: 120 MMHG | HEIGHT: 63 IN | HEART RATE: 81 BPM

## 2021-03-18 DIAGNOSIS — Z01.419 WOMEN'S ANNUAL ROUTINE GYNECOLOGICAL EXAMINATION: Primary | ICD-10-CM

## 2021-03-18 DIAGNOSIS — N91.2 LACTATIONAL AMENORRHEA: ICD-10-CM

## 2021-03-18 DIAGNOSIS — Z86.2 HISTORY OF ANEMIA: ICD-10-CM

## 2021-03-18 DIAGNOSIS — N94.10 DYSPAREUNIA, FEMALE: ICD-10-CM

## 2021-03-18 DIAGNOSIS — F41.9 ANXIETY: ICD-10-CM

## 2021-03-18 PROCEDURE — G8484 FLU IMMUNIZE NO ADMIN: HCPCS | Performed by: ADVANCED PRACTICE MIDWIFE

## 2021-03-18 PROCEDURE — 99395 PREV VISIT EST AGE 18-39: CPT | Performed by: ADVANCED PRACTICE MIDWIFE

## 2021-03-18 PROCEDURE — 36415 COLL VENOUS BLD VENIPUNCTURE: CPT | Performed by: ADVANCED PRACTICE MIDWIFE

## 2021-03-18 SDOH — SOCIAL STABILITY: SOCIAL NETWORK: IN A TYPICAL WEEK, HOW MANY TIMES DO YOU TALK ON THE PHONE WITH FAMILY, FRIENDS, OR NEIGHBORS?: NOT ASKED

## 2021-03-18 SDOH — ECONOMIC STABILITY: TRANSPORTATION INSECURITY
IN THE PAST 12 MONTHS, HAS LACK OF TRANSPORTATION KEPT YOU FROM MEETINGS, WORK, OR FROM GETTING THINGS NEEDED FOR DAILY LIVING?: NO

## 2021-03-18 SDOH — ECONOMIC STABILITY: TRANSPORTATION INSECURITY
IN THE PAST 12 MONTHS, HAS THE LACK OF TRANSPORTATION KEPT YOU FROM MEDICAL APPOINTMENTS OR FROM GETTING MEDICATIONS?: NO

## 2021-03-18 SDOH — ECONOMIC STABILITY: INCOME INSECURITY: HOW HARD IS IT FOR YOU TO PAY FOR THE VERY BASICS LIKE FOOD, HOUSING, MEDICAL CARE, AND HEATING?: NOT HARD AT ALL

## 2021-03-18 SDOH — SOCIAL STABILITY: SOCIAL INSECURITY
WITHIN THE LAST YEAR, HAVE TO BEEN RAPED OR FORCED TO HAVE ANY KIND OF SEXUAL ACTIVITY BY YOUR PARTNER OR EX-PARTNER?: NO

## 2021-03-18 SDOH — SOCIAL STABILITY: SOCIAL NETWORK: HOW OFTEN DO YOU ATTENT MEETINGS OF THE CLUB OR ORGANIZATION YOU BELONG TO?: NOT ASKED

## 2021-03-18 SDOH — SOCIAL STABILITY: SOCIAL INSECURITY: WITHIN THE LAST YEAR, HAVE YOU BEEN HUMILIATED OR EMOTIONALLY ABUSED IN OTHER WAYS BY YOUR PARTNER OR EX-PARTNER?: NO

## 2021-03-18 SDOH — SOCIAL STABILITY: SOCIAL NETWORK: ARE YOU MARRIED, WIDOWED, DIVORCED, SEPARATED, NEVER MARRIED, OR LIVING WITH A PARTNER?: NOT ASKED

## 2021-03-18 SDOH — SOCIAL STABILITY: SOCIAL INSECURITY
WITHIN THE LAST YEAR, HAVE YOU BEEN KICKED, HIT, SLAPPED, OR OTHERWISE PHYSICALLY HURT BY YOUR PARTNER OR EX-PARTNER?: NO

## 2021-03-18 SDOH — SOCIAL STABILITY: SOCIAL NETWORK: HOW OFTEN DO YOU GET TOGETHER WITH FRIENDS OR RELATIVES?: NOT ASKED

## 2021-03-18 SDOH — SOCIAL STABILITY: SOCIAL NETWORK
DO YOU BELONG TO ANY CLUBS OR ORGANIZATIONS SUCH AS CHURCH GROUPS UNIONS, FRATERNAL OR ATHLETIC GROUPS, OR SCHOOL GROUPS?: NOT ASKED

## 2021-03-18 ASSESSMENT — ANXIETY QUESTIONNAIRES
5. BEING SO RESTLESS THAT IT IS HARD TO SIT STILL: 0-NOT AT ALL
6. BECOMING EASILY ANNOYED OR IRRITABLE: 1-SEVERAL DAYS
3. WORRYING TOO MUCH ABOUT DIFFERENT THINGS: 1-SEVERAL DAYS
1. FEELING NERVOUS, ANXIOUS, OR ON EDGE: 2-OVER HALF THE DAYS
7. FEELING AFRAID AS IF SOMETHING AWFUL MIGHT HAPPEN: 0-NOT AT ALL
2. NOT BEING ABLE TO STOP OR CONTROL WORRYING: 1-SEVERAL DAYS
4. TROUBLE RELAXING: 1-SEVERAL DAYS

## 2021-03-18 ASSESSMENT — PATIENT HEALTH QUESTIONNAIRE - PHQ9
SUM OF ALL RESPONSES TO PHQ QUESTIONS 1-9: 0
1. LITTLE INTEREST OR PLEASURE IN DOING THINGS: 0
SUM OF ALL RESPONSES TO PHQ9 QUESTIONS 1 & 2: 0

## 2021-03-18 NOTE — PROGRESS NOTES
years: 0.75    Quit date: 2009    Years since quittin.2   Smokeless Tobacco Never Used     Social History     Substance and Sexual Activity   Alcohol Use Yes    Alcohol/week: 0.0 standard drinks    Comment: social      Social History     Substance and Sexual Activity   Drug Use No       Current Outpatient Medications   Medication Sig Dispense Refill    MAGNESIUM PO Take 325 mg by mouth daily      b complex vitamins capsule Take 1 capsule by mouth daily      vitamin D (CHOLECALCIFEROL) 1000 UNIT TABS tablet Take 1 tablet by mouth daily      Ferrous Sulfate (IRON) 325 (65 Fe) MG TABS Take 325 mg by mouth daily      PRENAT VIT-FE FUM-FA-FISH OIL PO Take 1 tablet by mouth daily      metroNIDAZOLE (METROGEL VAGINAL) 0.75 % vaginal gel One applicator intravaginally every night for 5 days 1 Tube 1    ibuprofen (ADVIL;MOTRIN) 600 MG tablet Take 1 tablet by mouth every 6 hours as needed for Pain (Patient not taking: Reported on 2021) 30 tablet 0    Evening Primrose Oil 1000 MG CAPS Take by mouth      Misc. Devices (BREAST PUMP) MISC 1 Pump by Does not apply route as needed (lactating mother) (Patient not taking: Reported on 3/18/2021) 1 each 0     No current facility-administered medications for this visit. REVIEW OF SYSTEMS:  Review of Systems   Constitutional: Negative. HENT: Negative. Eyes: Negative. Respiratory: Negative. Cardiovascular: Negative. Gastrointestinal: Positive for constipation. Endocrine: Negative. Genitourinary: Negative. Musculoskeletal: Negative. Skin: Negative. Allergic/Immunologic: Negative. Neurological: Negative. Hematological: Negative. Psychiatric/Behavioral: Negative. Physical Exam:     Constitutional:   Blood pressure 120/78, pulse 81, temperature 97.1 °F (36.2 °C), temperature source Temporal, height 5' 3\" (1.6 m), weight 142 lb 6.4 oz (64.6 kg), last menstrual period 2018, currently breastfeeding.   Wt Readings from Last 3 Encounters:   03/18/21 142 lb 6.4 oz (64.6 kg)   01/14/21 148 lb (67.1 kg)   07/10/20 148 lb (67.1 kg)       General Appearance: This is a well-developed, well-nourished and well-groomed female    Skin:  Inspection of the skin revealed no rashes or lesions    Neck and EENT:  No eye discharge and sclera non-icteric  Lips, teeth and gums without lesions and normal dentition  Nares are patent without discharge  Normal external ears are present with no hearing loss  The neck was supple with full range of motion and no masses. The thyroid was not enlarged and had no masses. No enlarged cervical lymph nodes    Respiratory: The lungs were clear to auscultation bilaterally. There were no rales, rhonchi or wheezes. There was good respiratory effort. Cardiovascular: The heart was in a regular rhythm and rate was normal.  No murmur or extra sounds were noted. Breast:  The breasts are normal size and symmetrical.  There are no skin changes with position changes. The nipples are without deviations or discharge. No masses were palpated. No axillary or supraclavicular lymphadenopathy is present. Back:  Straight with no CVA tenderness present    Abdomen: The abdomen is soft and non-tender. There was no guarding, rebound or rigidity. The bladder was without fullness or tenderness. No hernias were appreciated. Pelvic: The external genitalia has a normal appearance without masses or lesions. There is no inguinal lymphadenopathy. Bladder/urethra without discharge or mass. Normal urethra. The vagina is pink and well rugated. The cervix is without lesions or discharge and with no CMT. Pap was obtained without difficulty. The uterus is midline, mobile, normal size/shape and non-tender. The adnexa are without masses or tenderness. Rectum is normal.    Musculoskeletal: Normal gait. No contractions with normal movement of all extremities.   No cyanosis or edema present    Psychiatric:  Alert, oriented to time, place, person and situation. There are no mood or affect changes. ASSESSMENT/PLAN:     Routine annual gynecological exam  .1. Women's annual routine gynecological examination  - PAP SMEAR; Future    2. Anxiety  - TSH With Reflex Ft4; Future  -discussed resources  -Pt does work in mental health     3. History of anemia  - CBC Auto Differential; Future  - OK COLLECTION VENOUS BLOOD,VENIPUNCTURE    4. Lactational amenorrhea  - hCG, Quantitative, Pregnancy; Future  -Considering pregnancy in the future    5. Dyspareunia, female  - [facility] [place]  - VAGINITIS DNA PROBE; Future  -Declines GC/CH screening discussed if continues it is recommended   -lubrication discussed, positions for comfort and discussed mental load as mother how can impact interest/lubrication    Counseling Completed:    discussed need for repeat pap as per American Society for Colposcopy and Cervical Pathology guidelines. discussed need for mammograms every 1 year, If >42 yo and last mammogram was negative. discussed Calcium and Vitamin D dosing. discussed need for colonoscopy screening as well as onset for bone density testing. discussed birth control and barrier recommendations. discussed STD counseling and prevention. discussed Gardisil counseling for all patients 9-25 yo. Hereditary Breast, Ovarian, Colon and Uterine Cancer screening discussed. Tobacco & Secondary smoke risks discussed; with recommendation for cessation and avoidance. Routine health maintenance per patients PCP discussed. Patient was seen with total face to face time of 25 minutes. More than 50% of this visit was counseling and education regarding    Diagnosis Orders   1. Women's annual routine gynecological examination  PAP SMEAR   2. Anxiety  TSH With Reflex Ft4   3. History of anemia  CBC Auto Differential    OK COLLECTION VENOUS BLOOD,VENIPUNCTURE   4.  Lactational amenorrhea  hCG, Quantitative, Pregnancy   5.  Dyspareunia, female  [address] [facility] [place]    VAGINITIS DNA PROBE       Electronically signed by Bouchra Gibson on 3/18/21 at 1:07 PM EDT

## 2021-03-18 NOTE — PROGRESS NOTES
Patent is concerned about weight gain and painful intercourse. Performed venipuncture of the LT arm. Pt tolerated well. Blood Flow obtained.   Collected x -SST x -Lav

## 2021-03-19 ENCOUNTER — TELEPHONE (OUTPATIENT)
Dept: OBGYN CLINIC | Age: 31
End: 2021-03-19

## 2021-03-19 DIAGNOSIS — N76.0 BV (BACTERIAL VAGINOSIS): Primary | ICD-10-CM

## 2021-03-19 DIAGNOSIS — B96.89 BV (BACTERIAL VAGINOSIS): Primary | ICD-10-CM

## 2021-03-19 LAB
ABSOLUTE EOS #: 0.17 K/UL (ref 0–0.44)
ABSOLUTE IMMATURE GRANULOCYTE: 0.03 K/UL (ref 0–0.3)
ABSOLUTE LYMPH #: 1.53 K/UL (ref 1.1–3.7)
ABSOLUTE MONO #: 0.53 K/UL (ref 0.1–1.2)
BASOPHILS # BLD: 1 % (ref 0–2)
BASOPHILS ABSOLUTE: 0.04 K/UL (ref 0–0.2)
DIFFERENTIAL TYPE: ABNORMAL
DIRECT EXAM: ABNORMAL
EOSINOPHILS RELATIVE PERCENT: 3 % (ref 1–4)
HCG QUANTITATIVE: <1 IU/L
HCT VFR BLD CALC: 45.6 % (ref 36.3–47.1)
HEMOGLOBIN: 14.2 G/DL (ref 11.9–15.1)
IMMATURE GRANULOCYTES: 0 %
LYMPHOCYTES # BLD: 23 % (ref 24–43)
Lab: ABNORMAL
MCH RBC QN AUTO: 29.2 PG (ref 25.2–33.5)
MCHC RBC AUTO-ENTMCNC: 31.1 G/DL (ref 28.4–34.8)
MCV RBC AUTO: 93.6 FL (ref 82.6–102.9)
MONOCYTES # BLD: 8 % (ref 3–12)
NRBC AUTOMATED: 0 PER 100 WBC
PDW BLD-RTO: 12.1 % (ref 11.8–14.4)
PLATELET # BLD: 184 K/UL (ref 138–453)
PLATELET ESTIMATE: ABNORMAL
PMV BLD AUTO: 11.7 FL (ref 8.1–13.5)
RBC # BLD: 4.87 M/UL (ref 3.95–5.11)
RBC # BLD: ABNORMAL 10*6/UL
SEG NEUTROPHILS: 65 % (ref 36–65)
SEGMENTED NEUTROPHILS ABSOLUTE COUNT: 4.47 K/UL (ref 1.5–8.1)
SPECIMEN DESCRIPTION: ABNORMAL
TSH SERPL DL<=0.05 MIU/L-ACNC: 0.96 MIU/L (ref 0.3–5)
WBC # BLD: 6.8 K/UL (ref 3.5–11.3)
WBC # BLD: ABNORMAL 10*3/UL

## 2021-03-19 RX ORDER — METRONIDAZOLE 7.5 MG/G
GEL VAGINAL
Qty: 1 TUBE | Refills: 1 | Status: SHIPPED | OUTPATIENT
Start: 2021-03-19 | End: 2021-03-26

## 2021-03-22 LAB
HPV SAMPLE: NORMAL
HPV, GENOTYPE 16: NOT DETECTED
HPV, GENOTYPE 18: NOT DETECTED
HPV, HIGH RISK OTHER: NOT DETECTED
HPV, INTERPRETATION: NORMAL
SPECIMEN DESCRIPTION: NORMAL

## 2021-03-22 ASSESSMENT — ENCOUNTER SYMPTOMS
RESPIRATORY NEGATIVE: 1
CONSTIPATION: 1
ALLERGIC/IMMUNOLOGIC NEGATIVE: 1
EYES NEGATIVE: 1

## 2021-03-25 LAB — CYTOLOGY REPORT: NORMAL

## 2021-03-26 ENCOUNTER — HOSPITAL ENCOUNTER (OUTPATIENT)
Dept: PHYSICAL THERAPY | Facility: CLINIC | Age: 31
Setting detail: THERAPIES SERIES
Discharge: HOME | End: 2021-03-26
Payer: MEDICARE

## 2021-03-26 PROCEDURE — 97110 THERAPEUTIC EXERCISES: CPT

## 2021-03-26 PROCEDURE — 97161 PT EVAL LOW COMPLEX 20 MIN: CPT

## 2021-03-26 PROCEDURE — G0283 ELEC STIM OTHER THAN WOUND: HCPCS

## 2021-03-26 NOTE — CONSULTS
[x] [facility]  Outpatient Rehabilitation &  Therapy  [address]  P: [phone]  F: [phone] [] [address]  P: [phone]  F: [phone] [] [address]  [facility] [place] [facility]   [address]  P: [phone]  F: [phone]     Physical Therapy Pelvic Floor Evaluation    Date:  3/26/2021  Patient: Roger Barbosa  : 1990  MRN: [id number]  Physician: Gallito Esparza    Insurance: [place] [employer] (30 vs)  Medical Diagnosis:  Dyspareunia   Rehab Codes: N39.3, M62.50  Onset Date: 2019                                  Next 's appt:     Subjective:   CC:Pt is a 27 yo female who presents with complaints of dyspareunia, pelvic pain and stress incontinence. Pt states she had 4th vaginal delivery 17 with second degree tearing. Pain and weakness increase after each subsequent births. HPI: (onset date: 2019)     PMHx: []? Unremarkable        []? Diabetes     []? HTN                        []? Pacemaker              []? MI/Heart Problems []? Cancer       []? Arthritis       []? Other:              [x]? Refer to full medical chart  In EPIC      Tests:  []? X-Ray:        []? MRI:                       []? Other:     Medications: [x]? Refer to full medical record            []? None          []? Other:  Allergies:      [x]? Refer to full medical record []? None          []? Other:     Function:  Hand Dominance  [x]? Right  []? Left  Employer [employer]   Job Status [x]? Normal duty   []? Light duty   []? Off due to condition    []? Retired   []? Not employed   []? Disability  []? Other:  []? Return to work: Work activities/duties       Pain:  [x]? Yes  []? No  Location: Lumbar/abd/PF       Pain Rating: (0-10 scale) 4/10  Pain altered Tx:  []? Yes  [x]? No  Action:     Symptoms:  []? Improving     []? Worsening  []? Same  Better:  []? AM    []? PM    []? Sit    []? Rise/Sit    []? Stand    []? Walk    []? Lying    [x]? Other:yoga  Worse: []? AM    []? PM    []? Sit    []? Rise/Sit    []? Stand    []? Walk    []? Lying    []? [place]                      []? Valsalva    [x]? Other:intercourse  Sleep: [x]? OK    []? Disturbed     Objective:                      OBSERVATION  No Deficit  Deficit  Not Tested  Comments    External                Posture  x          Pelvic alignment     x   Left post innominate rotation and right sacral rotation     Muscle Spasm  x          Scarring  x          Diastasis     x   1-2 finger widths     Introitus    x   Min gaping    Perineal Descent    x    Min descent    Skin Condition  x          Excursion    x   Min excursion    External Clock  x          Internal             Prolapse Test    x   Grade I cysto   Internal clock    x    Moderate tension over post PF and iliococcygeus musculature     Vaginal Vault    x   hypertonic   Muscle bulk    x   hypertonic   Muscle Power    x   2/5    Muscle Endurance    x   2  Sec w/muscle flickering   Quality of Contraction    x   Slow rise, slow release   Specificity    x   Min overflow of glutes, adductors    Coordination    x       Sensation  x          FUNCTION  Normal  Difficult  Unable        Cough/Sneeze    X         Supine-Sit    X         Squatting    X         Bending/ stooping    X         Stairs    X         Hopping     X         Jumping     X         Running     X         Lifting/carrying     X            Comments: Neg KTC, Neg SLR, Pos Artemio SIJ, Pos charley test dhiraj     Assessment:Pt noted with pelvic malalignment, increased tenderness and PF tension as well as weak PF/core musculature. Will provide modalities, MET for pelvic alignment correction, stretches, biofeedback and  ex program. May benefit from vaginal dilators for home. Problems:   [x]? ?? ? Pain:  []??? ? ROM:  [x]??? ? Strength:  [x]??? ? Function:  [x]??? Other:     STG: (to be met in 6 treatments)  1. ? Pain: 0/10 pain  2. Normal pelvic alignment  3. ? Strength: Improve core strength  4. ? Function: Able to perform all basic ADL's without difficulty. 5. Patient to be independent with home exercise program as demonstrated by performance with correct form without cues. LTG: (to be met in 12 treatments)  1. Able to isolate PF muscle long enough to inhibit bladder contraction  2. Able to resume intercourse without pain or difficulty. 3. Cough, sneeze, laugh without incontinence.     Patient goals: To increase strength and feel better when intimate     Rehab Potential:  [x]? Good  []? Fair  []? Poor              Suggested Professional Referral:  [x]? No  []? Yes:  Barriers to Goal Achievement:  [x]? No  []? Yes:  Domestic Concerns:  [x]? No  []? Yes:     Pt. Education:  [x]? Plans/Goals, Risks/Benefits discussed  [x]? Home exercise program  Method of Education: [x]? Verbal  [x]? Demo  [x]? Written (MET correction ex, Piriformis/hip flexor stretches)  Comprehension of Education:  [x]? Verbalizes understanding. [x]? Demonstrates understanding. []? Needs Review. []? Demonstrates/verbalizes understanding of HEP/Ed previously given.     Treatment Plan:  [x]? Therapeutic Exercise   81570             []? Iontophoresis: 4 mg/mL Dexamethasone Sodium Phosphate  mAmin  61852   []? Therapeutic Activity  39417 []? Vasopneumatic cold with compression  [id number]               []? Gait Training    97744 [x]? Ultrasound        [id number]   [x]? Neuromuscular Re-education  [id number] [x]? Electrical Stimulation Unattended  22 [id number]   [x]? Manual Therapy  60797 []? Electrical Stimulation Attended  [id number]   [x]? Instruction in HEP       []? Lumbar/Cervical Traction  [id number]   []? Aquatic Therapy           [id number] [x]? Cold/hotpack     []? Massage   73185      []? Dry Needling, 1 or 2 muscles  86196   [x]? Biofeedback, first 15 minutes   30618  []? Biofeedback, additional 15 minutes   82834 []?  Dry Needling, 3 or more muscles  36238    Frequency:  2 x/week for 12 visits     Todays Treatment:  Modalities:  Treatment Location  Left      Right                          [id number]   Lumbar/SIJ/PF [x]? ?          [x]? ?  [x]? ? Supine    []? ? Prone   []? ? Side lying  []? ? Sitting                                            Treatment Modality   1 Hot Pack:    [x]? ? Large   []? ? Medium    [x]? ? Cervical x2    ___20__ min     Cold Pack   []? ? Large   []? ? Medium    []? ? Cervical     _____ min     Vasocompression    __° temp    ____pressure     _____ min   1 Electrical Stim:    []? ? IFC     []? ? MFAC      [x]? ?[address]                          Protocol:___analgesic_____X__20___min                          #Channels:  []?? 1        []? ? 2       []? ? Other:   Begin 2nd/3rd visit Ultrasound: __1.5____W/cm2 x  ___10___min              [x]? ? 1MHz   []? ? 3Mhz                      Duty Factor: [x]? ? 100%  []?? 50%   []? ? 20%                  Add: []?? Lidex   []? ? Stim    []? ? Gel pad   PF     Massage:    []? ? Soft Tissue   []? ? Cross Friction                      []? ? Ice ____min   3 Other:  Stretches, EX                              Precautions:  Exercises:  Exercise Reps/ Time Weight/ Level Comments   MET 5x 10 sec supine   Supine PRC ex 20x       Piriformis stretches 10 10 sec     Hip flexor stretches 10 10 sec        Specific Instructions for next treatment:Recheck pelvic alignment. Diaphragmatic breathing ex     Evaluation Complexity:  History (Personal factors, comorbidities) [x]? 0 []? 1-2 []? 3+   Exam (limitations, restrictions) [x]? 1-2 []? 3 []? 4+   Clinical presentation (progression) [x]? Stable []? Evolving  []? Unstable   Decision Making [x]? Low []? Moderate []? High     [x]? Low Complexity []? Moderate Complexity []? High Complexity         Treatment Charges: Mins Units   [x]? Evaluation       [x]? Low       []? Moderate       []? High 20 1   [x]? Modalities: HP/ES 20/20 0/1   [x]? Ther Exercise 15 1   []? Manual Therapy       []?   Ther Activities       []? Aquatics       []? Vasocompression       []?   Other          TOTAL TREATMENT TIME: 55     Time in:1400     Time out:1500     Electronically signed by: Jairo Herman PT     Physician Signature:________________________________Date:__________________  By signing above or cosigning this note, I have reviewed this plan of care and certify a need for medically necessary rehabilitation services.      *PLEASE SIGN ABOVE AND FAX BACK ALL PAGES*

## 2021-03-30 ENCOUNTER — HOSPITAL ENCOUNTER (OUTPATIENT)
Dept: PHYSICAL THERAPY | Facility: CLINIC | Age: 31
Setting detail: THERAPIES SERIES
Discharge: HOME | End: 2021-03-30
Payer: MEDICARE

## 2021-03-30 PROCEDURE — 97110 THERAPEUTIC EXERCISES: CPT

## 2021-03-30 PROCEDURE — G0283 ELEC STIM OTHER THAN WOUND: HCPCS

## 2021-03-30 NOTE — FLOWSHEET NOTE
[x] [address]    Outpatient Rehabilitation &  Therapy  [address] [place] [address]  P: [phone]  F: [phone]      Physical Therapy Daily Treatment Note    Date:  3/30/2021  Patient Name:  Joey Self    :  1990  MRN: [id number]  Physician: [address]: [place] [employer] (30 vs)  Medical Diagnosis:  Dyspareunia     Rehab Codes: N39.3, M62.50  Onset Date: 2019                                  Next 's appt:   Visit# / total visits: 2     Cancels/No Shows: 0    Subjective:    Pain:  [] Yes  [x] No Location: N/A Pain Rating: (0-10 scale) 0/10  Pain altered Tx:  [x] No  [] Yes  Action:  Comments: No pain to report at arrival. Did mention that she liked the estim at last visit. Objective:  Modalities:  Treatment Location  Left      Right                          [id number]   Lumbar/SIJ/PF [x]? ??          [x]? ??  [x]? ?? Supine    []? ?? Prone   []? ?? Side lying  []??? Sitting                                            Treatment Modality   1 Hot Pack:    [x]? ?? Large   []? ?? Medium    [x]? ?? Cervical x2    ___20__ min     Cold Pack   []? ?? Large   []? ?? Medium    []? ?? Cervical     _____ min     Vasocompression    __° temp    ____pressure     _____ min   1 Electrical Stim:    []? ?? IFC     []? ?? MFAC      [x]? ?? HVPC                          Protocol:___analgesic_____X__20___min                          #Channels:  []??? 1        []??? 2       []? ?? Other:   Begin / visit Ultrasound: __1.5____W/cm2 x  ___10___min              [x]? ?? 1MHz   []? ?? 3Mhz                      Duty Factor: [x]??? 100%  []??? 50%   []? ?? 20%                  Add: []??? Lidex   []? ?? Stim    []? ?? Gel pad   PF     Massage:    []? ?? Soft Tissue   []? ?? Cross Friction                      []??? Ice ____min   3 Other:  Stretches, EX                              Precautions:  Exercises:  Exercise Reps/ Time Weight/ Level Comments   MET 5x 10 sec supine   Supine PRC ex 20x       Piriformis stretches 10 10 sec     Hip flexor stretches 10 10 sec     Diaphragmatic Breathing Ex 10     TA activation 10 5 sec    TA w/ alt UE/LE x20        Specific Instructions for next treatment:Recheck pelvic alignment      Treatment Charges: Mins Units   [x]  Modalities - HP/ES 20/20 0/1   [x]  Ther Exercise 25 2   []  Manual Therapy     []  Ther Activities     []  Aquatics     []  Vasocompression     []  Other     Total Treatment time 45 3       Assessment: [x] Progressing toward goals. Initiated session with MHP/ES to pelvic floor to promote muscle relaxation. Pt presented out of alignment this date, corrected with MET and shotgun technique with audible pop in L hip. Continued with gentle stretches and added diaphragmatic breathing and TA ex with good tolerance. [] No change. [x] Other:    STG: (to be met in 6 treatments)  1. ? Pain: 0/10 pain  2. Normal pelvic alignment  3. ? Strength: Improve core strength  4. ? Function: Able to perform all basic ADL's without difficulty. 5. Patient to be independent with home exercise program as demonstrated by performance with correct form without cues. LTG: (to be met in 12 treatments)  1. Able to isolate PF muscle long enough to inhibit bladder contraction  2. Able to resume intercourse without pain or difficulty. 3. Cough, sneeze, laugh without incontinence. Pt. Education:  [x] Yes  [] No  [x] Reviewed Prior HEP/Ed  Method of Education: [x] Verbal  [x] Demo  [] Written  Comprehension of Education:  [x] Verbalizes understanding. [x] Demonstrates understanding. [] Needs review. [] Demonstrates/verbalizes HEP/Ed previously given. Plan: [x] Continue with current plan of care towards goals.         Time In: 3:02 pm            Time Out: 3:55 pm    Electronically signed by:  NII Berry The above documentation completed by Jose Cruz Elena, Student Physical Therapist Assistant, was reviewed and accepted by supervising Clinical Instructor [address] [place] [address].

## 2021-04-06 ENCOUNTER — HOSPITAL ENCOUNTER (OUTPATIENT)
Dept: PHYSICAL THERAPY | Facility: CLINIC | Age: 31
Setting detail: THERAPIES SERIES
Discharge: HOME | End: 2021-04-06
Payer: MEDICARE

## 2021-04-06 PROCEDURE — 97110 THERAPEUTIC EXERCISES: CPT

## 2021-04-06 PROCEDURE — 97140 MANUAL THERAPY 1/> REGIONS: CPT

## 2021-04-06 PROCEDURE — G0283 ELEC STIM OTHER THAN WOUND: HCPCS

## 2021-04-06 NOTE — FLOWSHEET NOTE
[x] [address]   Outpatient Rehabilitation &  Therapy  [facility]  P: [phone]  F: [phone]      Physical Therapy Daily Treatment Note    Date:  2021  Patient Name:  Veronica Solano    :  1990  MRN: [id number]  Physician: 320 [address]: [place] [employer]age (30 vs)  Medical Diagnosis:  Dyspareunia     Rehab Codes: N39.3, M62.50  Onset Date: 2019                                  Next 's appt:   Visit# / total visits: 3/12     Cancels/No Shows: 0    Subjective:    Pain:  [] Yes  [x] No Location: N/A Pain Rating: (0-10 scale) 0/10  Pain altered Tx:  [x] No  [] Yes  Action:  Comments: Pt reports she has not yet attempted intercourse out of fear of having pain. Felt some pain in her hips/LB yesterday, which has subsided today. Objective:  Modalities:  Treatment Location  Left      Right                          [id number]   Lumbar/SIJ/PF [x]? ??          [x]? ??  [x]? ?? Supine    []? ?? Prone   []? ?? Side lying  []??? Sitting                                            Treatment Modality   1 Hot Pack:    [x]? ?? Large   []? ?? Medium    [x]? ?? Cervical x2    ___20__ min     Cold Pack   []? ?? Large   []? ?? Medium    []? ?? Cervical     _____ min     Vasocompression    __° temp    ____pressure     _____ min   1 Electrical Stim:    []? ?? IFC     []? ?? MFAC      [x]? ?? HVPC                          Protocol:___analgesic_____X__20___min                          #Channels:  []??? 1        []??? 2       []? ?? Other:   Begin next visit Ultrasound: __1.5____W/cm2 x  ___10___min              [x]? ?? 1MHz   []? ?? 3Mhz                      Duty Factor: [x]??? 100%  []??? 50%   []? ?? 20%                  Add: []??? Lidex   []? ?? Stim    []? ?? Gel pad   PF     Massage:    []? ?? Soft Tissue   []? ?? Cross Friction                      []??? Ice ____min   3 Other:  Stretches, EX                              Precautions:  Exercises:  Exercise Reps/ Time Weight/ Level Comments   MET 5x 10 sec supine   Supine PRC ex 20x       Piriformis stretches 10 10 sec     Hip flexor stretches 10 10 sec     Diaphragmatic Breathing Ex 10     TA activation 10 5 sec    TA w/ alt UE/LE x20      Other: DI/hypervolt to B piriformis/glut med  Specific Instructions for next treatment: US to pelvic floor      Treatment Charges: Mins Units   [x]  Modalities - HP/ES 20/20 0/1   [x]  Ther Exercise 15 1   [x]  Manual Therapy 10 1   []  Ther Activities     []  Aquatics     []  Vasocompression     []  Other     Total Treatment time 45 3       Assessment: [x] Progressing toward goals. Initiated session with MHP/ES to pelvic floor to promote muscle relaxation. Pt presented out of alignment this date, corrected with MET and shotgun technique with audible pop in R hip. Reviewed diaphragmatic breathing ex and emphasized expanding through pelvic floor, fair carry over. Initiated hypervolt to B piriformis, did not tolerate well d/t being ticklish. Performed DI instead with improved tolerance. Plan to initiate US at next visit. [] No change. [x] Other:    STG: (to be met in 6 treatments)  1. ? Pain: 0/10 pain  2. Normal pelvic alignment  3. ? Strength: Improve core strength  4. ? Function: Able to perform all basic ADL's without difficulty. 5. Patient to be independent with home exercise program as demonstrated by performance with correct form without cues. LTG: (to be met in 12 treatments)  1. Able to isolate PF muscle long enough to inhibit bladder contraction  2. Able to resume intercourse without pain or difficulty. 3. Cough, sneeze, laugh without incontinence. Pt. Education:  [x] Yes  [] No  [x] Reviewed Prior HEP/Ed  Method of Education: [x] Verbal  [x] Demo  [] Written  Comprehension of Education:  [x] Verbalizes understanding. [x] Demonstrates understanding. [] Needs review. [] Demonstrates/verbalizes HEP/Ed previously given.      Plan: [x] Continue with current plan of care towards goals.         Time In: 3:02 pm            Time Out: 3:55 pm    Electronically signed by:  Nahid Rosales PTA

## 2021-04-09 ENCOUNTER — HOSPITAL ENCOUNTER (OUTPATIENT)
Dept: PHYSICAL THERAPY | Facility: CLINIC | Age: 31
Setting detail: THERAPIES SERIES
Discharge: HOME | End: 2021-04-09
Payer: MEDICARE

## 2021-04-09 PROCEDURE — G0283 ELEC STIM OTHER THAN WOUND: HCPCS

## 2021-04-09 PROCEDURE — 97110 THERAPEUTIC EXERCISES: CPT

## 2021-04-09 PROCEDURE — 97035 APP MDLTY 1+ULTRASOUND EA 15: CPT

## 2021-04-09 NOTE — FLOWSHEET NOTE
[x] [address]    Outpatient Rehabilitation &  Therapy  [address] [place] [address]  P: [phone]  F: [phone]      Physical Therapy Daily Treatment Note    Date:  2021  Patient Name:  Eyad Vides    :  1990  MRN: [id number]  Physician: 320 [address]: [place] [employer] (30 vs)  Medical Diagnosis:  Dyspareunia     Rehab Codes: N39.3, M62.50  Onset Date: 2019                                  Next 's appt:   Visit# / total visits:      Cancels/No Shows: 0    Subjective:    Pain:  [] Yes  [x] No Location: N/A Pain Rating: (0-10 scale) 0/10  Pain altered Tx:  [x] No  [] Yes  Action:  Comments: Pt reports some soreness in her LB. Has not yet attempted intercourse. Objective:  Modalities:  Treatment Location  Left      Right                          [id number]   Lumbar/SIJ/PF [x]? ??          [x]? ??  [x]? ?? Supine    []? ?? Prone   []? ?? Side lying  []??? Sitting                                            Treatment Modality   1 Hot Pack:    [x]? ?? Large   []? ?? Medium    [x]? ?? Cervical x2    ___20__ min     Cold Pack   []? ?? Large   []? ?? Medium    []? ?? Cervical     _____ min     Vasocompression    __° temp    ____pressure     _____ min   1 Electrical Stim:    []? ?? IFC     []? ?? MFAC      [x]? ?? HVPC                          Protocol:___analgesic_____X__20___min                          #Channels:  []??? 1        []??? 2       []? ?? Other:   1 Ultrasound: __1.5____W/cm2 x  ___10___min              [x]? ?? 1MHz   []? ?? 3Mhz                      Duty Factor: [x]??? 100%  []??? 50%   []? ?? 20%                  Add: []??? Lidex   []? ?? Stim    []? ?? Gel pad   PF     Massage:    []? ?? Soft Tissue   []? ?? Cross Friction                      []??? Ice ____min   3 Other:  Stretches, EX                              Precautions:  Exercises:  Exercise Reps/ Time Weight/ Level Comments   MET 5x 10 sec supine   Supine PRC ex 20x       Piriformis

## 2021-04-12 ENCOUNTER — HOSPITAL ENCOUNTER (OUTPATIENT)
Dept: PHYSICAL THERAPY | Facility: CLINIC | Age: 31
Setting detail: THERAPIES SERIES
Discharge: HOME | End: 2021-04-12
Payer: MEDICARE

## 2021-04-12 PROCEDURE — G0283 ELEC STIM OTHER THAN WOUND: HCPCS

## 2021-04-12 PROCEDURE — 97035 APP MDLTY 1+ULTRASOUND EA 15: CPT

## 2021-04-12 PROCEDURE — 97110 THERAPEUTIC EXERCISES: CPT

## 2021-04-12 NOTE — FLOWSHEET NOTE
Piriformis stretches 10 10 sec     Hip flexor stretches 10 10 sec     Diaphragmatic Breathing Ex 10     TA activation 10 5 sec    TA w/ alt UE/LE x20      Other: DI/hypervolt to B piriformis/glut med - not today  Specific Instructions for next treatment:     Treatment Charges: Mins Units   [x]  Modalities - HP/ES  US 20/20  10 0/1  1   [x]  Ther Exercise 15 1   []  Manual Therapy     []  Ther Activities     []  Aquatics     []  Vasocompression     []  Other     Total Treatment time 45 3       Assessment: [x] Progressing toward goals. Cont with MHP/ES and initiated US to pelvic floor to promote deep tissue relaxation. Slight rotation noted, corrected with MET and shotgun maneuver with audible pop in L hip. Reviewed diaphragmatic breathing and educated pt to perform prior to intercourse for pelvic floor relaxation,     [] No change. [x] Other:    STG: (to be met in 6 treatments)  1. ? Pain: 0/10 pain  2. Normal pelvic alignment  3. ? Strength: Improve core strength  4. ? Function: Able to perform all basic ADL's without difficulty. 5. Patient to be independent with home exercise program as demonstrated by performance with correct form without cues. LTG: (to be met in 12 treatments)  1. Able to isolate PF muscle long enough to inhibit bladder contraction  2. Able to resume intercourse without pain or difficulty. 3. Cough, sneeze, laugh without incontinence. Pt. Education:  [x] Yes  [] No  [x] Reviewed Prior HEP/Ed  Method of Education: [x] Verbal  [x] Demo  [] Written  Comprehension of Education:  [x] Verbalizes understanding. [x] Demonstrates understanding. [] Needs review. [] Demonstrates/verbalizes HEP/Ed previously given. Plan: [x] Continue with current plan of care towards goals.         Time In: 5:00 pm           Time Out: 6:00 pm    Electronically signed by:  Bret Chávez PTA

## 2021-04-16 ENCOUNTER — HOSPITAL ENCOUNTER (OUTPATIENT)
Dept: PHYSICAL THERAPY | Facility: CLINIC | Age: 31
Setting detail: THERAPIES SERIES
Discharge: HOME | End: 2021-04-16
Payer: MEDICARE

## 2021-04-16 PROCEDURE — 97035 APP MDLTY 1+ULTRASOUND EA 15: CPT

## 2021-04-16 PROCEDURE — 97110 THERAPEUTIC EXERCISES: CPT

## 2021-04-16 PROCEDURE — G0283 ELEC STIM OTHER THAN WOUND: HCPCS

## 2021-04-16 NOTE — FLOWSHEET NOTE
[x] 5017    Outpatient Rehabilitation &  Therapy  [address] [place] [address]  P: [phone]  F: [phone]      Physical Therapy Daily Treatment Note    Date:  2021  Patient Name:  Veronica Solano    :  1990  MRN: [id number]  Physician: 320 [address] Street: [place] [employer] (30 vs)  Medical Diagnosis:  Dyspareunia     Rehab Codes: N39.3, M62.50  Onset Date: 2019                                  Next 's appt:   Visit# / total visits:      Cancels/No Shows: 0    Subjective:    Pain:  [] Yes  [x] No Location: N/A Pain Rating: (0-10 scale) 0/10  Pain altered Tx:  [x] No  [] Yes  Action:  Comments: Pt reports no real pain today and not really sure if she feels improvement. Notes not always compliant with HEP, but tries to stretch with her workouts or after running. Objective:  Modalities:  Treatment Location  Left      Right                          [id number]   Lumbar/SIJ/PF [x]? ??          [x]? ??  [x]? ?? Supine    []? ?? Prone   []? ?? Side lying  []??? Sitting                                            Treatment Modality   1 Hot Pack:    [x]? ?? Large   []? ?? Medium    [x]? ?? Cervical x2    ___20__ min     Cold Pack   []? ?? Large   []? ?? Medium    []? ?? Cervical     _____ min     Vasocompression    __° temp    ____pressure     _____ min   1 Electrical Stim:    []? ?? IFC     []? ?? MFAC      [x]? ?? HVPC                          Protocol:___analgesic_____X__20___min                          #Channels:  []??? 1        []??? 2       []? ?? Other:   1 Ultrasound: __1.5____W/cm2 x  ___10___min              [x]? ?? 1MHz   []? ?? 3Mhz                      Duty Factor: [x]??? 100%  []??? 50%   []? ?? 20%                  Add: []??? Lidex   []? ?? Stim    []? ?? Gel pad   PF     Massage:    []? ?? Soft Tissue   []? ?? Cross Friction                      []??? Ice ____min   3 Other:  Stretches, EX                              Precautions:  Exercises:  Exercise Electronically signed by:  Lavon Logan PTA

## 2021-04-19 ENCOUNTER — HOSPITAL ENCOUNTER (OUTPATIENT)
Dept: PHYSICAL THERAPY | Facility: CLINIC | Age: 31
Setting detail: THERAPIES SERIES
Discharge: HOME | End: 2021-04-19
Payer: MEDICARE

## 2021-04-19 PROCEDURE — 97035 APP MDLTY 1+ULTRASOUND EA 15: CPT

## 2021-04-19 PROCEDURE — 97110 THERAPEUTIC EXERCISES: CPT

## 2021-04-19 PROCEDURE — G0283 ELEC STIM OTHER THAN WOUND: HCPCS

## 2021-04-19 NOTE — FLOWSHEET NOTE
[x] [address]    Outpatient Rehabilitation &  Therapy  [address] [facility]  P: [phone]  F: [phone]      Physical Therapy Daily Treatment Note    Date:  2021  Patient Name:  Judah Morley    :  1990  MRN: [id number]  Physician: 320 [address] Street: [place] [employer] (30 vs)  Medical Diagnosis:  Dyspareunia     Rehab Codes: N39.3, M62.50  Onset Date: 2019                                  Next 's appt:   Visit# / total visits:      Cancels/No Shows: 0    Subjective:    Pain:  [] Yes  [x] No Location: N/A Pain Rating: (0-10 scale) 0/10  Pain altered Tx:  [x] No  [] Yes  Action:  Comments: Pt reports she had intercourse over the weekend, states it was just as painful as before. States she did try the diaphragmatic breathing beforehand. Objective:  Modalities:  Treatment Location  Left      Right                          [id number]   Lumbar/SIJ/PF [x]? ??          [x]? ??  [x]? ?? Supine    []? ?? Prone   []? ?? Side lying  []??? Sitting                                            Treatment Modality   1 Hot Pack:    [x]? ?? Large   []? ?? Medium    [x]? ?? Cervical x2    ___20__ min     Cold Pack   []? ?? Large   []? ?? Medium    []? ?? Cervical     _____ min     Vasocompression    __° temp    ____pressure     _____ min   1 Electrical Stim:    []? ?? IFC     []? ?? MFAC      [x]? ?? HVPC                          Protocol:___analgesic_____X__20___min                          #Channels:  []??? 1        []??? 2       []? ?? Other:   1 Ultrasound: __1.5____W/cm2 x  ___10___min              [x]? ?? 1MHz   []? ?? 3Mhz                      Duty Factor: [x]??? 100%  []??? 50%   []? ?? 20%                  Add: []??? Lidex   []? ?? Stim    []? ?? Gel pad   PF     Massage:    []? ?? Soft Tissue   []? ?? Cross Friction                      []??? Ice ____min   3 Other:  Stretches, EX                              Precautions:  Exercises:  Exercise Reps/ Time Weight/ Level Comments   MET 5x 10 sec supine   Supine PRC ex 20x       Piriformis stretches 10 10 sec     Hip flexor stretches 10 10 sec  EOB   Diaphragmatic Breathing Ex 10     TA activation 10 5 sec HEP   TA w/ alt UE/LE x20      Other: DI/hypervolt to B piriformis/glut med - not today  Specific Instructions for next treatment:     Treatment Charges: Mins Units   [x]  Modalities - HP/ES  US 20/20  10 0/1  1   [x]  Ther Exercise 15 1   []  Manual Therapy     []  Ther Activities     []  Aquatics     []  Vasocompression     []  Other     Total Treatment time 45 3       Assessment: [x] Progressing toward goals. Pt presents in alignment this date. Continued with MHP/ES followed by US to the pelvic floor to promote pain relief and muscle relaxation. No change in sx post tx.     [] No change. [x] Other:    STG: (to be met in 6 treatments)  1. ? Pain: 0/10 pain  2. Normal pelvic alignment  3. ? Strength: Improve core strength  4. ? Function: Able to perform all basic ADL's without difficulty. 5. Patient to be independent with home exercise program as demonstrated by performance with correct form without cues. LTG: (to be met in 12 treatments)  1. Able to isolate PF muscle long enough to inhibit bladder contraction  2. Able to resume intercourse without pain or difficulty. 3. Cough, sneeze, laugh without incontinence. Pt. Education:  [x] Yes  [] No  [x] Reviewed Prior HEP/Ed  Method of Education: [x] Verbal  [x] Demo  [] Written  Comprehension of Education:  [x] Verbalizes understanding. [x] Demonstrates understanding. [] Needs review. [] Demonstrates/verbalizes HEP/Ed previously given. Plan: [x] Continue with current plan of care towards goals.         Time In: 5:00 pm          Time Out: 5:55 pm    Electronically signed by:  Keegan Jordan PTA

## 2021-04-23 ENCOUNTER — HOSPITAL ENCOUNTER (OUTPATIENT)
Dept: PHYSICAL THERAPY | Facility: CLINIC | Age: 31
Setting detail: THERAPIES SERIES
Discharge: HOME | End: 2021-04-23
Payer: MEDICARE

## 2021-04-23 PROCEDURE — 97035 APP MDLTY 1+ULTRASOUND EA 15: CPT

## 2021-04-23 PROCEDURE — 97140 MANUAL THERAPY 1/> REGIONS: CPT

## 2021-04-23 PROCEDURE — G0283 ELEC STIM OTHER THAN WOUND: HCPCS

## 2021-04-26 ENCOUNTER — HOSPITAL ENCOUNTER (OUTPATIENT)
Dept: PHYSICAL THERAPY | Facility: CLINIC | Age: 31
Setting detail: THERAPIES SERIES
Discharge: HOME | End: 2021-04-26
Payer: MEDICARE

## 2021-04-26 PROCEDURE — 97140 MANUAL THERAPY 1/> REGIONS: CPT

## 2021-04-26 PROCEDURE — G0283 ELEC STIM OTHER THAN WOUND: HCPCS

## 2021-04-26 PROCEDURE — 97035 APP MDLTY 1+ULTRASOUND EA 15: CPT

## 2021-04-26 NOTE — FLOWSHEET NOTE
[x] 5017   Outpatient Rehabilitation &  Therapy  [address] [place] [address]  P: [phone]  F: [phone]      Physical Therapy Daily Treatment Note    Date:  2021  Patient Name:  Mary Garcia    :  1990  MRN: [id number]  Physician: 320 [address]: [place] [employer] (30 vs)  Medical Diagnosis:  Dyspareunia     Rehab Codes: N39.3, M62.50  Onset Date: 2019                                  Next 's appt:   Visit# / total visits:      Cancels/No Shows: 0    Subjective:    Pain:  [x] Yes  [] No Location: N/A Pain Rating: (0-10 scale) 5/10 with intercourse  Pain altered Tx:  [x] No  [] Yes  Action:  Comments: Pt reports no pain at tx time. Cont tension deysi R piriformis area and pt notes she could feel it when doing lunges at home earlier. Objective:  Modalities:  Treatment Location  Left      Right                          [id number]   Lumbar/SIJ/PF [x]? ??          [x]? ??  [x]? ?? Supine    []? ?? Prone   []? ?? Side lying  []??? Sitting                                            Treatment Modality   1 Hot Pack:    [x]? ?? Large   []? ?? Medium    [x]? ?? Cervical x2    ___20__ min     Cold Pack   []? ?? Large   []? ?? Medium    []? ?? Cervical     _____ min     Vasocompression    __° temp    ____pressure     _____ min   1 Electrical Stim:    []? ?? IFC     []? ?? MFAC      [x]? ?? HVPC                          Protocol:___analgesic_____X__20___min                          #Channels:  []??? 1        []??? 2       []? ?? Other:   2 Ultrasound: __1.5____W/cm2 x  ___10___min              [x]? ?? 1MHz   []? ?? 3Mhz                      Duty Factor: [x]??? 100%  []??? 50%   []? ?? 20%                  Add: []??? Lidex   []? ?? Stim    []? ?? Gel pad   R post PF/piriformis   3  Massage:    []? ?? Soft Tissue   []? ?? Cross Friction                      []??? Ice ____min  [employer] R post PF/piriformis   4 Other:  Stretches, EX                            Precautions:  Exercises:  Exercise Reps/ Time Weight/ Level Comments   MET 5x 10 sec Supine/Not today   Supine PRC ex 20x       Piriformis stretches 10 10 sec     Hip flexor stretches 10 10 sec  EOB   Diaphragmatic Breathing Ex 10     TA activation 10 5 sec HEP   TA w/ alt UE/LE x20      Other: Hypervolt R piriformis/post PF x10min   Specific Instructions for next treatment:     Treatment Charges: Mins Units   [x]  Modalities - HP/ES  US 20/20  10 0/1  1   [x]  Ther Exercise 5 0   [x]  Manual Therapy 10 1   []  Ther Activities     []  Aquatics     []  Vasocompression     []  Other     Total Treatment time 45 3       Assessment: [x] Progressing toward goals. Pt presents in alignment this date. Continued with MHP/ES followed by US to R piriformis and post PF area. Tenderness noted this area with palpation. Cont with hypervolt to R piriformis/PF area with good michael. Cont stretching ex and reviewed for HEP. Will cont to monitor sx.    [] No change. [] Other:    STG: (to be met in 6 treatments)  1. ? Pain: 0/10 pain  2. Normal pelvic alignment  3. ? Strength: Improve core strength  4. ? Function: Able to perform all basic ADL's without difficulty. 5. Patient to be independent with home exercise program as demonstrated by performance with correct form without cues. LTG: (to be met in 12 treatments)  1. Able to isolate PF muscle long enough to inhibit bladder contraction  2. Able to resume intercourse without pain or difficulty. 3. Cough, sneeze, laugh without incontinence. Pt. Education:  [x] Yes  [] No  [x] Reviewed Prior HEP/Ed  Method of Education: [x] Verbal  [x] Demo  [] Written  Comprehension of Education:  [x] Verbalizes understanding. [x] Demonstrates understanding. [] Needs review. [] Demonstrates/verbalizes HEP/Ed previously given. Plan: [x] Continue with current plan of care towards goals.         Time In: 5:00pm          Time Out: 6:00pm    Electronically signed

## 2021-04-30 ENCOUNTER — HOSPITAL ENCOUNTER (OUTPATIENT)
Dept: PHYSICAL THERAPY | Facility: CLINIC | Age: 31
Setting detail: THERAPIES SERIES
Discharge: HOME | End: 2021-04-30
Payer: MEDICARE

## 2021-04-30 PROCEDURE — 97140 MANUAL THERAPY 1/> REGIONS: CPT

## 2021-04-30 PROCEDURE — G0283 ELEC STIM OTHER THAN WOUND: HCPCS

## 2021-04-30 PROCEDURE — 97035 APP MDLTY 1+ULTRASOUND EA 15: CPT

## 2021-05-07 ENCOUNTER — HOSPITAL ENCOUNTER (OUTPATIENT)
Dept: PHYSICAL THERAPY | Facility: CLINIC | Age: 31
Setting detail: THERAPIES SERIES
Discharge: HOME | End: 2021-05-07
Payer: MEDICARE

## 2021-05-07 PROCEDURE — G0283 ELEC STIM OTHER THAN WOUND: HCPCS

## 2021-05-07 PROCEDURE — 97140 MANUAL THERAPY 1/> REGIONS: CPT

## 2021-05-07 PROCEDURE — 97035 APP MDLTY 1+ULTRASOUND EA 15: CPT

## 2021-05-07 NOTE — FLOWSHEET NOTE
given.     Plan: [x] Continue with current plan of care towards goals.         Time In: 11:00am          Time Out: 12:00pm    Electronically signed by:  Lyla Lance PTA

## 2021-05-10 ENCOUNTER — HOSPITAL ENCOUNTER (OUTPATIENT)
Dept: PHYSICAL THERAPY | Facility: CLINIC | Age: 31
Setting detail: THERAPIES SERIES
Discharge: HOME | End: 2021-05-10
Payer: MEDICARE

## 2021-05-10 PROCEDURE — G0283 ELEC STIM OTHER THAN WOUND: HCPCS

## 2021-05-10 PROCEDURE — 97140 MANUAL THERAPY 1/> REGIONS: CPT

## 2021-05-10 PROCEDURE — 97035 APP MDLTY 1+ULTRASOUND EA 15: CPT

## 2021-05-10 NOTE — FLOWSHEET NOTE
[x] [address]    Outpatient Rehabilitation &  Therapy  [address] [place] [address]  P: [phone]  F: [phone]      Physical Therapy Daily Treatment Note    Date:  5/10/2021  Patient Name:  Martin Sanchez YOB: 1990  MRN: [id number]  Physician: 320 [address]: [place] [employer] (30 vs)  Medical Diagnosis:  Dyspareunia     Rehab Codes: N39.3, M62.50  Onset Date: 2019                                  Next 's appt:   Visit# / total visits:      Cancels/No Shows: 0    Subjective:    Pain:  [x] Yes  [] No Location: N/A Pain Rating: (0-10 scale) 1-2/10   Pain altered Tx:  [x] No  [] Yes  Action:  Comments: Pt reports feeling her SIJ pop while doing her ex today. No other pain c/o. Pt notes she has not attempted intercourse lately. Objective:  Modalities:  Treatment Location  Left      Right                          [id number]   Lumbar/SIJ/PF [x]? ??          [x]? ??  [x]? ?? Supine    []? ?? Prone   []? ?? Side lying  []??? Sitting                                            Treatment Modality   1 Hot Pack:    [x]? ?? Large   []? ?? Medium    [x]? ?? Cervical x2    ___20__ min     Cold Pack   []? ?? Large   []? ?? Medium    []? ?? Cervical     _____ min     Vasocompression    __° temp    ____pressure     _____ min   1 Electrical Stim:    []? ?? IFC     []? ?? MFAC      [x]? ?? HVPC                          Protocol:___analgesic_____X__20___min                          #Channels:  []??? 1        []??? 2       []? ?? Other:   2 Ultrasound: __1.5____W/cm2 x  ___10___min              [x]? ?? 1MHz   []? ?? 3Mhz                      Duty Factor: [x]??? 100%  []??? 50%   []? ?? 20%                  Add: []??? Lidex   []? ?? Stim    []? ?? Gel pad   R post PF/piriformis   3  Massage:    []? ?? Soft Tissue   []? ?? Cross Friction                      []??? Ice ____min  [employer] R post PF/piriformis   4 Other:  Stretches, EX                            Precautions:  Exercises:  Exercise Reps/ Time Weight/ Level Comments   MET 5x 10 sec Supine/ HELD 5/7/21   Supine PRC ex 20x       Piriformis stretches 10 10 sec     Hip flexor stretches 10 10 sec  EOB   Diaphragmatic Breathing Ex 10  reviewed   TA activation 10 5 sec HO given 5/10   TA w/ alt UE/LE x20     TA with march      TA with bridge      PF ex: short/long holds 10x ea. 2\"/5\" HO given 5/10          Other: Hypervolt bilat piriformis/post PF x10min     Specific Instructions for next treatment:     Treatment Charges: Mins Units   [x]  Modalities - HP/ES  US 20/20  10 0/1  1   [x]  Ther Exercise 10 0   [x]  Manual Therapy 10 1   []  Ther Activities     []  Aquatics     []  Vasocompression     []  Other     Total Treatment time 50 3       Assessment: [x] Progressing toward goals. Pt presents in alignment this date. Cont with modalities and manual to bilat piriformis. Cont with stretching and reviewed for HEP. Pt feels looser in hips overall. HO and education given for PF ex and basic core stab ex to cont at home. Pt to cont with HEP and have recheck in 2 weeks. [] No change. [] Other:    STG: (to be met in 6 treatments)  1. ? Pain: 0/10 pain  2. Normal pelvic alignment  3. ? Strength: Improve core strength  4. ? Function: Able to perform all basic ADL's without difficulty. 5. Patient to be independent with home exercise program as demonstrated by performance with correct form without cues. LTG: (to be met in 12 treatments)  1. Able to isolate PF muscle long enough to inhibit bladder contraction  2. Able to resume intercourse without pain or difficulty. 3. Cough, sneeze, laugh without incontinence. Pt. Education:  [x] Yes  [] No  [x] Reviewed Prior HEP/Ed  Method of Education: [x] Verbal  [x] Demo  [] Written  Comprehension of Education:  [x] Verbalizes understanding. [x] Demonstrates understanding. [] Needs review.   [] Demonstrates/verbalizes HEP/Ed previously given.     Plan: [x] Continue with current plan of care towards goals. Pt to cont with indep program.  Plan for recheck in 2 weeks.         Time In: 5:00pm          Time Out: 6:00  pm    Electronically signed by:  Lyla Lance PTA

## 2021-05-21 ENCOUNTER — HOSPITAL ENCOUNTER (OUTPATIENT)
Dept: PHYSICAL THERAPY | Facility: CLINIC | Age: 31
Setting detail: THERAPIES SERIES
Discharge: HOME | End: 2021-05-21
Payer: MEDICARE

## 2021-05-21 PROCEDURE — G0283 ELEC STIM OTHER THAN WOUND: HCPCS

## 2021-05-21 PROCEDURE — 97140 MANUAL THERAPY 1/> REGIONS: CPT

## 2021-05-21 NOTE — FLOWSHEET NOTE
[x] 5[address]    Outpatient Rehabilitation &  Therapy  [address] [place] [address]  P: [phone]  F: [phone]      Physical Therapy Daily Treatment Note    Date:  2021  Patient Name:  Sharron Posada    :  1990  MRN: [id number]  Physician: 320 [address] Street: [place] [employer] (30 vs)  Medical Diagnosis:  Dyspareunia     Rehab Codes: N39.3, M62.50  Onset Date: 2019                                  Next 's appt:   Visit# / total visits: 13/     Cancels/No Shows: 0    Subjective:    Pain:  [x] Yes  [] No Location: N/A Pain Rating: (0-10 scale) 1-2/10   Pain altered Tx:  [x] No  [] Yes  Action:  Comments: Pt reports feeling her SIJ pop while doing stab ex last week. Pain seems less in hips and PF over the last week but continues over SIJ. Objective:  Modalities:  Treatment Location  Left      Right                          [id number]   Lumbar/SIJ/PF [x]? ??          [x]? ??  [x]? ?? Supine    []? ?? Prone   []? ?? Side lying  []??? Sitting                                            Treatment Modality   1 Hot Pack:    [x]? ?? Large   []? ?? Medium    [x]? ?? Cervical x2    ___20__ min     Cold Pack   []? ?? Large   []? ?? Medium    []? ?? Cervical     _____ min     Vasocompression    __° temp    ____pressure     _____ min   1 Electrical Stim:    []? ?? IFC     []? ?? MFAC      [x]? ?? HVPC                          Protocol:___analgesic_____X__20___min                          #Channels:  []??? 1        []??? 2       []? ?? Other:   Held  Ultrasound: __1.5____W/cm2 x  ___10___min              [x]? ?? 1MHz   []? ?? 3Mhz                      Duty Factor: [x]??? 100%  []??? 50%   []? ?? 20%                  Add: []??? Lidex   []? ?? Stim    []? ?? Gel pad   R post PF/piriformis   3  Massage:    []? ?? Soft Tissue   []? ?? Cross Friction                      []??? Ice ____min  [employer] R post PF/piriformis   4 Other:  Stretches, EX                            Precautions:  Exercises:  Exercise Reps/ Time Weight/ Level Comments   MET 5x 10 sec Supine/ HELD 5/7/21   Supine PRC ex 20x       Piriformis stretches 10 10 sec     Hip flexor stretches 10 10 sec  EOB   Diaphragmatic Breathing Ex 10  reviewed   TA activation 10 5 sec HO given 5/10   TA w/ alt UE/LE x20     TA with march      TA with bridge      PF ex: short/long holds 10x ea. 2\"/5\" HO given 5/10          Other: Hypervolt bilat piriformis/post PF x10min     Specific Instructions for next treatment:     Treatment Charges: Mins Units   [x]  Modalities - HP/ES   20/20   0/1     []  Ther Exercise     [x]  Manual Therapy 10 1   []  Ther Activities     []  Aquatics     []  Vasocompression     []  Other     Total Treatment time 30 2       Assessment: [x] Progressing toward goals. Pt presents in alignment this date. Cont with HP/ES and manual to bilat piriformis. Held US today due to re check due with primary therapist.  Plan to cont with a few more PT visits to address cont tightness R post PF area. To add man release this area at next visit. Showed pt seated self release with fingers or tennis ball for HEP, and reviewed current HEP. [] No change. [] Other:    STG: (to be met in 6 treatments)  1. ? Pain: 0/10 pain  2. Normal pelvic alignment  3. ? Strength: Improve core strength  4. ? Function: Able to perform all basic ADL's without difficulty. 5. Patient to be independent with home exercise program as demonstrated by performance with correct form without cues. LTG: (to be met in 12 treatments)  1. Able to isolate PF muscle long enough to inhibit bladder contraction  2. Able to resume intercourse without pain or difficulty. 3. Cough, sneeze, laugh without incontinence. Pt. Education:  [x] Yes  [] No  [x] Reviewed Prior HEP/Ed  Method of Education: [x] Verbal  [x] Demo  [] Written  Comprehension of Education:  [x] Verbalizes understanding. [x] Demonstrates understanding.   []

## 2021-05-28 ENCOUNTER — HOSPITAL ENCOUNTER (OUTPATIENT)
Dept: PHYSICAL THERAPY | Facility: CLINIC | Age: 31
Setting detail: THERAPIES SERIES
Discharge: HOME | End: 2021-05-28
Payer: MEDICARE

## 2021-05-28 NOTE — FLOWSHEET NOTE
[] [facility]) - [facility] &  Therapy  [address]    [phone]  F: [phone]   [] [address] [facility] [address]   [address]  P: [phone]  F: [phone]  [] [address] &  Therapy  [facility]  P: [phone]  F: [phone] [] [address]  P: [phone]  F: [phone]  [] [address]  [place]   [address]   [place]: [phone]  F: [phone]   [] [address]  [place]: [phone]   F:      Physical Therapy Cancel/No Show note    Date: 2021  Patient: Alissa Cabrera  : 1990  MRN: [id number]    Cancels/No Shows to date:     For today's appointment patient:    [x]  Cancelled    [] Rescheduled appointment    [] No-show     Reason given by patient:    []  Patient ill    []  Conflicting appointment    [] No transportation      [] Conflict with work    [] No reason given    [] Weather related    [] [id number]    [] Other:      Comments:        [] Next appointment was confirmed    Electronically signed by: Wilda Parekh

## 2021-06-04 ENCOUNTER — HOSPITAL ENCOUNTER (OUTPATIENT)
Dept: PHYSICAL THERAPY | Facility: CLINIC | Age: 31
Setting detail: THERAPIES SERIES
Discharge: HOME | End: 2021-06-04
Payer: MEDICARE

## 2021-06-04 PROCEDURE — G0283 ELEC STIM OTHER THAN WOUND: HCPCS

## 2021-06-04 PROCEDURE — 97140 MANUAL THERAPY 1/> REGIONS: CPT

## 2021-06-15 ENCOUNTER — HOSPITAL ENCOUNTER (OUTPATIENT)
Dept: PHYSICAL THERAPY | Facility: CLINIC | Age: 31
Setting detail: THERAPIES SERIES
Discharge: HOME | End: 2021-06-15
Payer: MEDICARE

## 2021-06-15 PROCEDURE — 97140 MANUAL THERAPY 1/> REGIONS: CPT

## 2021-06-15 PROCEDURE — G0283 ELEC STIM OTHER THAN WOUND: HCPCS

## 2021-06-15 PROCEDURE — 97110 THERAPEUTIC EXERCISES: CPT

## 2021-06-15 NOTE — FLOWSHEET NOTE
[x] [address]    Outpatient Rehabilitation &  Therapy  [address] [place] [address]  P: [phone]  F: [phone]      Physical Therapy Daily Treatment Note    Date:  6/15/2021  Patient Name:  Martin Sanchez YOB: 1990  MRN: [id number]  Physician: [address]: [employer] [employer] (30 vs)  Medical Diagnosis:  Dyspareunia     Rehab Codes: N39.3, M62.50  Onset Date: 2019                                  Next 's appt:   Visit# / total visits:      Cancels/No Shows: 0    Subjective:    Pain:  [] Yes  [x] No Location: N/A Pain Rating: (0-10 scale) 0/10   Pain altered Tx:  [x] No  [] Yes  Action:  Comments: Pt reports no pain at arrival. Plan to continue PT for a couple more weeks to work on tension relief. Objective:  Modalities:  Treatment Location  Left      Right                          [id number]   Lumbar/SIJ/PF [x]? ??          [x]? ??  [x]? ?? Supine    []? ?? Prone   []? ?? Side lying  []??? Sitting                                            Treatment Modality   1 Hot Pack:    [x]? ?? Large   []? ?? Medium    [x]? ?? Cervical x2    ___20__ min     Cold Pack   []? ?? Large   []? ?? Medium    []? ?? Cervical     _____ min     Vasocompression    __° temp    ____pressure     _____ min   1 Electrical Stim:    []? ?? IFC     []? ?? MFAC      [x]? ?? HVPC                          Protocol:___analgesic_____X__20___min                          #Channels:  []??? 1        []??? 2       []? ?? Other:   Held  Ultrasound: __1.5____W/cm2 x  ___10___min              [x]? ?? 1MHz   []? ?? 3Mhz                      Duty Factor: [x]??? 100%  []??? 50%   []? ?? 20%                  Add: []??? Lidex   []? ?? Stim    []? ?? Gel pad   R post PF/piriformis   3  Massage:    []? ?? Soft Tissue   []? ?? Cross Friction                      []??? Ice ____min  [employer] R post PF/piriformis   4 Other:  Stretches, EX                              Precautions:  Exercises:  Exercise Reps/ Time Weight/ Level Comments   MET 5x 10 sec Supine/ HELD 5/7/21   Supine PRC ex 20x       Piriformis stretches 10 10 sec     Hip flexor stretches 10 10 sec  EOB   Diaphragmatic Breathing Ex 10  reviewed   TA activation 10 5 sec HO given 5/10   TA w/ alt UE/LE x20     TA with march      TA with bridge      PF ex: short/long holds 10x ea. 2\"/5\" HO given 5/10          Other: Hypervolt bilat piriformis/post PF x10min     Specific Instructions for next treatment:     Treatment Charges: Mins Units   [x]  Modalities - HP/ES 20/20   0/1     [x]  Ther Exercise 10 1   [x]  Manual Therapy 10 1   []  Ther Activities     []  Aquatics     []  Vasocompression     []  Other     Total Treatment time 40 3       Assessment: [x] Progressing toward goals. Continued with  MHP/ES followed by manual. Focused DI and hypervolt on B piriformis, more TTP L>R. Cont with stretches per log above with good tolerance. Instructed pt to be sure she is performing self DI to bilateral hips, as she stated she was only focusing on the R.     [] No change. [] Other:    STG: (to be met in 6 treatments)  1. ? Pain: 0/10 pain  2. Normal pelvic alignment  3. ? Strength: Improve core strength  4. ? Function: Able to perform all basic ADL's without difficulty. 5. Patient to be independent with home exercise program as demonstrated by performance with correct form without cues. LTG: (to be met in 12 treatments)  1. Able to isolate PF muscle long enough to inhibit bladder contraction  2. Able to resume intercourse without pain or difficulty. 3. Cough, sneeze, laugh without incontinence. Pt. Education:  [x] Yes  [] No  [] Reviewed Prior HEP/Ed  Method of Education: [x] Verbal  [] Demo  [] Written  Comprehension of Education:  [x] Verbalizes understanding. [x] Demonstrates understanding. [] Needs review. [] Demonstrates/verbalizes HEP/Ed previously given. Plan: [x] Continue with current plan of care towards goals.           Time In: 3:00 pm Time Out: 3:55 pm    Electronically signed by:  Sruthi Grigbsy PTA

## 2021-06-21 ENCOUNTER — HOSPITAL ENCOUNTER (OUTPATIENT)
Dept: PHYSICAL THERAPY | Facility: CLINIC | Age: 31
Setting detail: THERAPIES SERIES
Discharge: HOME | End: 2021-06-21
Payer: MEDICARE

## 2021-06-21 PROCEDURE — 97110 THERAPEUTIC EXERCISES: CPT

## 2021-06-21 PROCEDURE — G0283 ELEC STIM OTHER THAN WOUND: HCPCS

## 2021-06-21 NOTE — FLOWSHEET NOTE
20x     x   Piriformis stretches 10 10 sec   x   Hip flexor stretches 10 10 sec  EOB x   Diaphragmatic Breathing Ex 10  reviewed    TA activation 10 5 sec HO given 5/10    TA w/ alt UE/LE x20      TA with march       SB bridge    x   PF ex: short/long holds 10x ea. 2\"/5\" HO given 5/10    Quadruped alt UE/LE x20   x   Paloff Press x15 10#  x    Other: Hypervolt bilat piriformis/post PF x10min - not today     Specific Instructions for next treatment:     Treatment Charges: Mins Units   [x]  Modalities - HP/ES 20/20 0/1     [x]  Ther Exercise 25 2   []  Manual Therapy     []  Ther Activities     []  Aquatics     []  Vasocompression     []  Other     Total Treatment time 45 3       Assessment: [x] Progressing toward goals. Cont with MHP/ES followed by stretches. Held off on hypervolt d/t no palpable tension. Progressed core strengthening this date with good tolerance. [] No change. [] Other:    STG: (to be met in 6 treatments)  1. ? Pain: 0/10 pain  2. Normal pelvic alignment  3. ? Strength: Improve core strength  4. ? Function: Able to perform all basic ADL's without difficulty. 5. Patient to be independent with home exercise program as demonstrated by performance with correct form without cues. LTG: (to be met in 12 treatments)  1. Able to isolate PF muscle long enough to inhibit bladder contraction  2. Able to resume intercourse without pain or difficulty. 3. Cough, sneeze, laugh without incontinence. Pt. Education:  [x] Yes  [] No  [] Reviewed Prior HEP/Ed  Method of Education: [x] Verbal  [] Demo  [] Written  Comprehension of Education:  [x] Verbalizes understanding. [x] Demonstrates understanding. [] Needs review. [] Demonstrates/verbalizes HEP/Ed previously given. Plan: [x] Continue with current plan of care towards goals.           Time In: 5:00 pm         Time Out: 5:53 pm    Electronically signed by:  Vadim Barba PTA

## 2021-06-28 ENCOUNTER — HOSPITAL ENCOUNTER (OUTPATIENT)
Dept: PHYSICAL THERAPY | Facility: CLINIC | Age: 31
Setting detail: THERAPIES SERIES
Discharge: HOME | End: 2021-06-28
Payer: MEDICARE

## 2021-06-28 PROCEDURE — 97110 THERAPEUTIC EXERCISES: CPT

## 2021-06-28 PROCEDURE — G0283 ELEC STIM OTHER THAN WOUND: HCPCS

## 2021-06-28 NOTE — FLOWSHEET NOTE
[x] [address]    Outpatient Rehabilitation &  Therapy  [address] [place] [address]  P: [phone]  F: [phone]      Physical Therapy Daily Treatment Note    Date:  2021  Patient Name:  Geovanni Wynn    :  1990  MRN: [id number]  Physician: [address]: [place] [employer] (30 vs)  Medical Diagnosis:  Dyspareunia     Rehab Codes: N39.3, M62.50  Onset Date: 2019                                  Next 's appt:   Visit# / total visits:      Cancels/No Shows: 0    Subjective:    Pain:  [x] Yes  [] No Location: N/A Pain Rating: (0-10 scale) 2/10   Pain altered Tx:  [x] No  [] Yes  Action:  Comments: Pt reports overall feels 60% improved since starting PT. Has not been having any pain with intercourse. Notes her only complaint now is constant ache across the middle of her LB. Objective:  Modalities:  Treatment Location  Left      Right                          [id number]   Lumbar/SIJ/PF [x]? ??          [x]? ??  [x]? ?? Supine    []? ?? Prone   []? ?? Side lying  []??? Sitting                                            Treatment Modality   1 Hot Pack:    [x]? ?? Large   []? ?? Medium    [x]? ?? Cervical x2    ___20__ min     Cold Pack   []? ?? Large   []? ?? Medium    []? ?? Cervical     _____ min     Vasocompression    __° temp    ____pressure     _____ min   1 Electrical Stim:    []? ?? IFC     []? ?? MFAC      [x]? ??[address]                          Protocol:___analgesic_____X__20___min                          #Channels:  [x]? ?? 1        []??? 2       []? ?? Other:   Held  Ultrasound: __1.5____W/cm2 x  ___10___min              [x]? ?? 1MHz   []? ?? 3Mhz                      Duty Factor: [x]??? 100%  []??? 50%   []? ?? 20%                  Add: []??? Lidex   []? ?? Stim    []? ?? Gel pad   R post PF/piriformis   3  Massage:    []? ?? Soft Tissue   []? ?? Cross Friction                      []??? Ice ____min  Hypervolt R post PF/piriformis   4 Other:  Stretches, EX                              Precautions:  Exercises:  Exercise Reps/ Time Weight/ Level Comments    MET 5x 10 sec  x   Supine PRC ex 20x     x   Piriformis stretches 10 10 sec   x   Hip flexor stretches 10 10 sec  EOB x   Diaphragmatic Breathing Ex 10  reviewed    TA activation 10 5 sec HO given 5/10    TA w/ alt UE/LE x20      TA with march       SB bridge    x   PF ex: short/long holds 10x ea. 2\"/5\" HO given 5/10    Quadruped alt UE/LE x20   x   Clamshells x20 blue  x   SL hip abduction x20 blue  x   4 way hip x10 B blue  x           Other: Hypervolt bilat piriformis/post PF x10min - not today     Specific Instructions for next treatment:     Treatment Charges: Mins Units   [x]  Modalities - HP/ES 20/20 0/1     [x]  Ther Exercise 25 2   []  Manual Therapy     []  Ther Activities     []  Aquatics     []  Vasocompression     []  Other     Total Treatment time 45 3       Assessment: [x] Progressing toward goals. Presents out of alignment, corrected with MET. Cont with MHP/ES followed by stretches. Cues needed to keep stretches within comfortable ROM. Added hip strengthening ex as noted in log above and issued handout for HEP. Pt to complete HEP for the next couple weeks independently and is scheduled for re-check with primary therapist. Noni Bunting to call back and get on the schedule sooner if needed. [] No change. [] Other:    STG: (to be met in 6 treatments)  1. ? Pain: 0/10 pain  2. Normal pelvic alignment  3. ? Strength: Improve core strength  4. ? Function: Able to perform all basic ADL's without difficulty. 5. Patient to be independent with home exercise program as demonstrated by performance with correct form without cues. LTG: (to be met in 12 treatments)  1. Able to isolate PF muscle long enough to inhibit bladder contraction  2. Able to resume intercourse without pain or difficulty. 3. Cough, sneeze, laugh without incontinence.     Pt. Education:  [x] Yes  [] No  [] Reviewed Prior HEP/Ed  Method of Education: [x] Verbal  [] Demo  [] Written  Comprehension of Education:  [x] Verbalizes understanding. [x] Demonstrates understanding. [] Needs review. [] Demonstrates/verbalizes HEP/Ed previously given. Plan: [x] Continue with current plan of care towards goals.           Time In: 5:00 pm         Time Out: 5:53 pm    Electronically signed by:  Lj Ellington PTA

## 2021-07-13 ENCOUNTER — TELEPHONE (OUTPATIENT)
Dept: PRIMARY CARE CLINIC | Age: 31
End: 2021-07-13

## 2021-07-13 NOTE — TELEPHONE ENCOUNTER
----- Message from Zakia Caal sent at 7/13/2021  5:03 PM EDT -----  Subject: Appointment Request    Reason for Call: Routine Physical Exam    QUESTIONS  Type of Appointment? Established Patient  Reason for appointment request? Available appointments did not meet   patient need  Additional Information for Provider? Patient would like to be seen the   same day and time as her  8/3/21 9:00 am  ---------------------------------------------------------------------------  --------------  CALL BACK INFO  What is the best way for the office to contact you? OK to leave message on   voicemail  Preferred Call Back Phone Number? [phone]  ---------------------------------------------------------------------------  --------------  SCRIPT ANSWERS  Relationship to Patient? Self  Have your symptoms changed? No  Have you been diagnosed with, awaiting test results for, or told that you   are suspected of having COVID-19 (Coronavirus)? (If patient has tested   negative or was tested as a requirement for work, school, or travel and   not based on symptoms, answer no)? No  Do you currently have flu-like symptoms including fever or chills, cough,   shortness of breath, difficulty breathing, or new loss of taste or smell? No  Have you had close contact with someone with COVID-19 in the last 14 days? No  (Service Expert  click yes below to proceed with [employer] As Usual   Scheduling)?  Yes

## 2021-07-23 ENCOUNTER — HOSPITAL ENCOUNTER (OUTPATIENT)
Dept: PHYSICAL THERAPY | Facility: CLINIC | Age: 31
Setting detail: THERAPIES SERIES
Discharge: HOME | End: 2021-07-23
Payer: MEDICARE

## 2021-07-23 PROCEDURE — 97140 MANUAL THERAPY 1/> REGIONS: CPT

## 2021-07-23 PROCEDURE — G0283 ELEC STIM OTHER THAN WOUND: HCPCS

## 2021-07-23 PROCEDURE — 97530 THERAPEUTIC ACTIVITIES: CPT

## 2021-07-23 PROCEDURE — 97110 THERAPEUTIC EXERCISES: CPT

## 2021-07-23 NOTE — FLOWSHEET NOTE
[x] [address]    Outpatient Rehabilitation &  Therapy  [address] [facility]  P: [phone]  F: [phone]      Physical Therapy Daily Treatment Note    Date:  2021  Patient Name:  Kat Tolentino    :  1990  MRN: [id number]  Physician: [address]: [employer] (30 vs)  Medical Diagnosis:  Dyspareunia     Rehab Codes: N39.3, M62.50  Onset Date: 2019                                  Next 's appt:   Visit# / total visits:      Cancels/No Shows: 0    Subjective:    Pain:  [x] Yes  [] No Location: LBP  Pain Rating: (0-10 scale) 2-3/10   Pain altered Tx:  [x] No  [] Yes  Action:  Comments: Pt arrives with mild discomfort and notes aching present across the midline of her LB.    Objective:  Modalities:  Treatment Location  Left      Right                          Position   Lumbar/SIJ/PF [x]          [x]  [x] Supine    [] Prone   [] Side lying  [] Sitting                                            Treatment Modality   1 Hot Pack:    [x] Large   [] Medium    [x] Cervical x2    ___20__ min     Cold Pack   [] Large   [] Medium    [] Cervical     _____ min     Vasocompression    __° temp    ____pressure     _____ min   1 Electrical Stim:    [] IFC     [] MFAC      [x] HVPC                          Protocol:___analgesic_____X__20___min                          #Channels:  [x] 1        [] 2       [] Other:   Held  Ultrasound: __1.5____W/cm2 x  ___10___min              [x] 1MHz   [] 3Mhz                      Duty Factor: [x] 100%  [] 50%   [] 20%                  Add: [] Lidex   [] Stim    [] Gel pad   R post PF/piriformis   3  Massage:    [] Soft Tissue   [] Cross Friction                      [] Ice __10__min  Hypervolt R post PF/piriformis   4 Other:  Stretches, EX                              Precautions:  Exercises:  Exercise Reps/ Time Weight/ Level Comments    MET 5x 10 sec  x   Supine PRC ex 20x     x   Piriformis stretches 10 10 sec   x   Hip flexor stretches 10 10 sec  EOB x   Diaphragmatic Breathing Ex 10  reviewed    TA activation 10 5 sec HO given 5/10    TA w/ alt UE/LE x20      TA with march       SB bridge       PF ex: short/long holds 10x ea. 2\"/5\" HO given 5/10    Quadruped alt UE/LE x20      Clamshells x20 blue  x   SL hip abduction x20 blue  x   4 way hip x10 B blue             Other: Hypervolt bilat piriformis/post PF x10min     Specific Instructions for next treatment:     Treatment Charges: Mins Units   [x]  Modalities - HP/ES 20/20   0/1     [x]  Ther Exercise 20 1   [x]  Manual Therapy 10 1   []  Ther Activities     []  Aquatics     []  Vasocompression     []  Other     Total Treatment time 50 3       Assessment: [x] Progressing toward goals. Pt reassessed by primary PT this date. Continued with E stim and moist hot pack to initiate session. Application of MET to correct pelvic alignment followed by stretches to increase tissue extensibility. Completed hip abductor strengthening and ended with manual via hypervolt to further reduce muscular tension. Pt with good tolerance to all. Discharged from PT this date and will complete HEP independently. [] No change. [] Other:    STG: (to be met in 6 treatments)  ? Pain: 0/10 pain  Normal pelvic alignment  ? Strength: Improve core strength  ? Function: Able to perform all basic ADL's without difficulty. Patient to be independent with home exercise program as demonstrated by performance with correct form without cues. LTG: (to be met in 12 treatments)  Able to isolate PF muscle long enough to inhibit bladder contraction  Able to resume intercourse without pain or difficulty. Cough, sneeze, laugh without incontinence. Pt. Education:  [x] Yes  [] No  [x] Reviewed Prior HEP/Ed  Method of Education: [x] Verbal  [] Demo  [] Written  Comprehension of Education:  [] Verbalizes understanding. [] Demonstrates understanding. [] Needs review.   [x] Demonstrates/verbalizes HEP/Ed previously given.      Plan: [x] Discharge PT          Time In: 11:10 am         Time Out: 12:00 pm    Electronically signed by:  Jared Mckenzie PTA

## 2021-09-10 ENCOUNTER — OFFICE VISIT (OUTPATIENT)
Dept: PRIMARY CARE CLINIC | Age: 31
End: 2021-09-10
Payer: MEDICARE

## 2021-09-10 VITALS
WEIGHT: 156 LBS | RESPIRATION RATE: 15 BRPM | BODY MASS INDEX: 26.63 KG/M2 | SYSTOLIC BLOOD PRESSURE: 112 MMHG | DIASTOLIC BLOOD PRESSURE: 68 MMHG | HEART RATE: 80 BPM | HEIGHT: 64 IN | OXYGEN SATURATION: 98 %

## 2021-09-10 DIAGNOSIS — L40.9 PSORIASIS: ICD-10-CM

## 2021-09-10 DIAGNOSIS — Z00.00 ANNUAL PHYSICAL EXAM: Primary | ICD-10-CM

## 2021-09-10 DIAGNOSIS — Z13.6 SCREENING FOR CARDIOVASCULAR CONDITION: ICD-10-CM

## 2021-09-10 DIAGNOSIS — N92.0 MENORRHAGIA WITH REGULAR CYCLE: ICD-10-CM

## 2021-09-10 DIAGNOSIS — Z13.0 SCREENING FOR DEFICIENCY ANEMIA: ICD-10-CM

## 2021-09-10 PROCEDURE — 99395 PREV VISIT EST AGE 18-39: CPT | Performed by: NURSE PRACTITIONER

## 2021-09-10 RX ORDER — TRIAMCINOLONE ACETONIDE 5 MG/G
CREAM TOPICAL
Qty: 15 G | Refills: 1 | Status: SHIPPED | OUTPATIENT
Start: 2021-09-10

## 2021-09-10 ASSESSMENT — ENCOUNTER SYMPTOMS
SINUS PRESSURE: 0
WHEEZING: 0
TROUBLE SWALLOWING: 0
SHORTNESS OF BREATH: 0
DIARRHEA: 0
BLOOD IN STOOL: 0
CONSTIPATION: 0
VOMITING: 0
SORE THROAT: 0
COUGH: 0
NAUSEA: 0
ABDOMINAL PAIN: 0

## 2021-09-10 ASSESSMENT — PATIENT HEALTH QUESTIONNAIRE - PHQ9
1. LITTLE INTEREST OR PLEASURE IN DOING THINGS: 0
SUM OF ALL RESPONSES TO PHQ QUESTIONS 1-9: 0
SUM OF ALL RESPONSES TO PHQ9 QUESTIONS 1 & 2: 0
SUM OF ALL RESPONSES TO PHQ QUESTIONS 1-9: 0
SUM OF ALL RESPONSES TO PHQ QUESTIONS 1-9: 0
2. FEELING DOWN, DEPRESSED OR HOPELESS: 0

## 2021-09-10 NOTE — PROGRESS NOTES
266 \[facility] [place] [facility]\"" PRIMARY CARE  [facility] [address] [facility] [address]  [address] [address]  Dept: [phone]  Dept Fax: [phone]    Lawrence Hazel is a 26 y.o. female who presentstoday for her medical conditions/complaints as noted below.   Lawrence Hazel is c/o of  Chief Complaint   Patient presents with    Annual Exam         HPI:     Here today for annual physical  Reports has been feeling well in general  Had visit with GYN in March  Has decreased her iron supplement to every other day due to constipation issues  She has added a stool softener which helps somewhat  However her periods are stll quite heavy so she is concerned about anemia developing again  She denies any sx of fatigue or SOB    She reports she follows healthy diet, tries to exercise several days a week with some type of cardio, is also busy and active with her for small children    Was referred to dermatology but was unable to keep appointment due to pandemic last year  She has developed an area of persistent redness and dryness on her right lower abdomen  Denies any itching, pain or bleeding  States has history of psoriasis as a child behind her knees      No results found for: LABA1C          ( goal A1C is < 7)   No results found for: LABMICR  No results found for: LDLCHOLESTEROL, LDLCALC    (goal LDL is <100)   BUN (mg/dL)   Date Value   2014 13     BP Readings from Last 3 Encounters:   09/10/21 112/68   21 120/78   21 107/71          ([id number])    Past Medical History:   Diagnosis Date    Constipation     and diarrhea - since first delivery    Elective  ,     Elevated homocysteine     Fracture phalanges, hand age 19    Fx navicular, foot-closed age 13    Hemorrhoids     after delivery #1    Hx of postpartum depression, currently pregnant     Hx of seasonal allergies     previously taking Nasonex      Past Surgical History:   Procedure Laterality Date    INDUCED   ,     WISDOM TOOTH EXTRACTION         Family History   Problem Relation Age of Onset    Heart Disease Maternal Grandmother     Stroke Maternal Grandmother     Hypertension Maternal Grandmother     Stroke Maternal Grandfather     Heart Attack Maternal Grandfather     Hypertension Maternal Grandfather     Alcohol Abuse Father     Depression Father     Migraines Mother     Other Mother         elevated homocysteine level    Heart Disease Maternal Aunt     Hypertension Maternal Aunt     Heart Disease Maternal Uncle     Hypertension Maternal Uncle           Social History     Tobacco Use    Smoking status: Former Smoker     Packs/day: 0.25     Years: 3.00     Pack years: 0.75     Quit date: 2009     Years since quittin.6    Smokeless tobacco: Never Used   Substance Use Topics    Alcohol use: Yes     Alcohol/week: 0.0 standard drinks     Comment: social       Current Outpatient Medications   Medication Sig Dispense Refill    Misc Natural Products (GLUCOSAMINE CHOND MSM FORMULA PO) Take by mouth      triamcinolone (ARISTOCORT) 0.5 % cream Apply topically 2 times daily. 15 g 1    ibuprofen (ADVIL;MOTRIN) 600 MG tablet Take 1 tablet by mouth every 6 hours as needed for Pain 30 tablet 0    Misc. Devices (BREAST PUMP) MISC 1 Pump by Does not apply route as needed (lactating mother) 1 each 0    MAGNESIUM PO Take 325 mg by mouth daily      b complex vitamins capsule Take 1 capsule by mouth daily      vitamin D (CHOLECALCIFEROL) 1000 UNIT TABS tablet Take 1 tablet by mouth daily      Ferrous Sulfate (IRON) 325 (65 Fe) MG TABS Take 325 mg by mouth daily      PRENAT VIT-FE FUM-FA-FISH OIL PO Take 1 tablet by mouth daily       No current facility-administered medications for this visit.      Allergies   Allergen Reactions    No Known Allergies        Health Maintenance   Topic Date Due    Hepatitis C screen  Never done    Varicella vaccine (1 of 2 - 2-dose childhood series) Never done    Flu vaccine (1) 09/10/2022 (Originally 9/1/2021)    Cervical cancer screen  03/18/2026    DTaP/Tdap/Td vaccine (3 - Td or Tdap) 05/05/2026    COVID-19 Vaccine  Completed    HIV screen  Completed    Hepatitis A vaccine  Aged Out    Hepatitis B vaccine  Aged Out    Hib vaccine  Aged Out    Meningococcal (ACWY) vaccine  Aged Out    Pneumococcal 0-64 years Vaccine  Aged Out       Subjective:      Review of Systems   Constitutional: Negative for activity change, appetite change, chills, fatigue, fever and unexpected weight change. HENT: Negative for congestion, ear pain, hearing loss, sinus pressure, sore throat and trouble swallowing. Eyes: Negative for visual disturbance. Respiratory: Negative for cough, shortness of breath and wheezing. Cardiovascular: Negative for chest pain, palpitations and leg swelling. Gastrointestinal: Negative for abdominal pain, blood in stool, constipation, diarrhea, nausea and vomiting. Endocrine: Negative for cold intolerance, heat intolerance, polydipsia, polyphagia and polyuria. Genitourinary: Negative for difficulty urinating, frequency, hematuria and urgency. Musculoskeletal: Negative for arthralgias and myalgias. Skin: Positive for rash (Single lesion right lower abdomen). Allergic/Immunologic: Negative for environmental allergies. Neurological: Negative for dizziness, weakness, light-headedness and headaches. Psychiatric/Behavioral: Negative for confusion. The patient is not nervous/anxious. Objective:     Physical Exam  Constitutional:       Appearance: She is well-developed. HENT:      Head: Normocephalic. Eyes:      Conjunctiva/sclera: Conjunctivae normal.      Pupils: Pupils are equal, round, and reactive to light. Cardiovascular:      Rate and Rhythm: Normal rate and regular rhythm. Heart sounds: Normal heart sounds. No murmur heard.      Pulmonary:      Effort: Pulmonary effort is normal. Breath sounds: Normal breath sounds. No wheezing. Abdominal:      General: Bowel sounds are normal. There is no distension. Palpations: Abdomen is soft. Musculoskeletal:         General: Normal range of motion. Cervical back: Normal range of motion. Skin:     General: Skin is warm and dry. Comments: Oval-shaped light red/pink lesion right lower abdomen, dry with mild crusting   Neurological:      Mental Status: She is alert and oriented to person, place, and time. Psychiatric:         Behavior: Behavior normal.         Thought Content: Thought content normal.         Judgment: Judgment normal.       /68   Pulse 80   Resp 15   Ht 5' 4\" (1.626 m)   Wt 156 lb (70.8 kg)   SpO2 98%   BMI 26.78 kg/m²     Assessment:       Diagnosis Orders   1. Annual physical exam  CBC With Auto Differential    Comprehensive Metabolic Panel    Lipid Panel   2. Screening for deficiency anemia  CBC With Auto Differential    Iron And TIBC    Ferritin   3. Screening for cardiovascular condition  Comprehensive Metabolic Panel    Lipid Panel   4. Psoriasis  triamcinolone (ARISTOCORT) 0.5 % cream    Darlene Cardozo MD, Dermatology, [place]   5. Menorrhagia with regular cycle               Plan:      Return in about 1 year (around 9/10/2022). Annual exam, heavy menses screening labs ordered, will repeat CBC along with iron levels. Continue every other day supplementation for now.   Continue healthy diet and regular exercise  Psoriasistriamcinolone to affected area on right lower abdomen, new referral placed for further follow-up with dermatology    Orders Placed This Encounter   Procedures    CBC With Auto Differential     Standing Status:   Future     Standing Expiration Date:   9/10/2022    Comprehensive Metabolic Panel     Standing Status:   Future     Standing Expiration Date:   9/10/2022    Iron And TIBC     Standing Status:   Future     Standing Expiration Date:   9/10/2022     Order Specific Question:   Is Patient Fasting? Answer:   yes     Order Specific Question:   No of Hours? Answer:   0    Ferritin     Standing Status:   Future     Standing Expiration Date:   9/10/2022    Lipid Panel     Standing Status:   Future     Standing Expiration Date:   9/10/2022     Order Specific Question:   Is Patient Fasting?/# of Hours     Answer:   Lela Araujo MD, Dermatology, [facility]     Referral Priority:   Routine     Referral Type:   Eval and Treat     Referral Reason:   Specialty Services Required     Referred to Provider:   Shey Avery MD     Requested Specialty:   Dermatology     Number of Visits Requested:   1        Orders Placed This Encounter   Medications    triamcinolone (ARISTOCORT) 0.5 % cream     Sig: Apply topically 2 times daily. Dispense:  15 g     Refill:  1       Patient given educational materials - see patient instructions. Discussed use, benefit, and side effects of prescribed medications. All patientquestions answered. Pt voiced understanding. Reviewed health maintenance. Instructedto continue current medications, diet and exercise. Patient agreed with treatmentplan. Follow up as directed.      Electronicallysigned by LEOLA Small CNP on 9/10/2021 at 1:53 PM

## 2021-09-20 ENCOUNTER — PATIENT MESSAGE (OUTPATIENT)
Dept: PRIMARY CARE CLINIC | Age: 31
End: 2021-09-20

## 2021-09-20 DIAGNOSIS — D22.9 MULTIPLE NEVI: ICD-10-CM

## 2021-09-20 DIAGNOSIS — L40.9 PSORIASIS: Primary | ICD-10-CM

## 2021-09-21 ENCOUNTER — TELEPHONE (OUTPATIENT)
Dept: GASTROENTEROLOGY | Age: 31
End: 2021-09-21

## 2021-10-01 ENCOUNTER — HOSPITAL ENCOUNTER (OUTPATIENT)
Age: 31
Setting detail: SPECIMEN
Discharge: HOME | End: 2021-10-01
Payer: MEDICARE

## 2021-10-01 DIAGNOSIS — Z13.6 SCREENING FOR CARDIOVASCULAR CONDITION: ICD-10-CM

## 2021-10-01 DIAGNOSIS — Z00.00 ANNUAL PHYSICAL EXAM: ICD-10-CM

## 2021-10-01 DIAGNOSIS — Z13.0 SCREENING FOR DEFICIENCY ANEMIA: ICD-10-CM

## 2021-10-01 LAB
ABSOLUTE EOS #: 0.11 K/UL (ref 0–0.44)
ABSOLUTE IMMATURE GRANULOCYTE: <0.03 K/UL (ref 0–0.3)
ABSOLUTE LYMPH #: 1.47 K/UL (ref 1.1–3.7)
ABSOLUTE MONO #: 0.46 K/UL (ref 0.1–1.2)
ALBUMIN SERPL-MCNC: 4.6 G/DL (ref 3.5–5.2)
ALBUMIN/GLOBULIN RATIO: 1.7 (ref 1–2.5)
ALP BLD-CCNC: 56 U/L (ref 35–104)
ALT SERPL-CCNC: 13 U/L (ref 5–33)
ANION GAP SERPL CALCULATED.3IONS-SCNC: 13 MMOL/L (ref 9–17)
AST SERPL-CCNC: 16 U/L
BASOPHILS # BLD: 1 % (ref 0–2)
BASOPHILS ABSOLUTE: 0.04 K/UL (ref 0–0.2)
BILIRUB SERPL-MCNC: 1.35 MG/DL (ref 0.3–1.2)
BUN BLDV-MCNC: 8 MG/DL (ref 6–20)
BUN/CREAT BLD: ABNORMAL (ref 9–20)
CALCIUM SERPL-MCNC: 9.4 MG/DL (ref 8.6–10.4)
CHLORIDE BLD-SCNC: 104 MMOL/L (ref 98–107)
CHOLESTEROL/HDL RATIO: 2
CHOLESTEROL: 168 MG/DL
CO2: 23 MMOL/L (ref 20–31)
CREAT SERPL-MCNC: 0.6 MG/DL (ref 0.5–0.9)
DIFFERENTIAL TYPE: ABNORMAL
EOSINOPHILS RELATIVE PERCENT: 2 % (ref 1–4)
FERRITIN: 29 UG/L (ref 13–150)
GFR AFRICAN AMERICAN: >60 ML/MIN
GFR NON-AFRICAN AMERICAN: >60 ML/MIN
GFR SERPL CREATININE-BSD FRML MDRD: ABNORMAL ML/MIN/{1.73_M2}
GFR SERPL CREATININE-BSD FRML MDRD: ABNORMAL ML/MIN/{1.73_M2}
GLUCOSE BLD-MCNC: 79 MG/DL (ref 70–99)
HCT VFR BLD CALC: 41.8 % (ref 36.3–47.1)
HDLC SERPL-MCNC: 83 MG/DL
HEMOGLOBIN: 13.4 G/DL (ref 11.9–15.1)
IMMATURE GRANULOCYTES: 0 %
IRON SATURATION: 38 % (ref 20–55)
IRON: 123 UG/DL (ref 37–145)
LDL CHOLESTEROL: 78 MG/DL (ref 0–130)
LYMPHOCYTES # BLD: 26 % (ref 24–43)
MCH RBC QN AUTO: 28.4 PG (ref 25.2–33.5)
MCHC RBC AUTO-ENTMCNC: 32.1 G/DL (ref 28.4–34.8)
MCV RBC AUTO: 88.6 FL (ref 82.6–102.9)
MONOCYTES # BLD: 8 % (ref 3–12)
NRBC AUTOMATED: 0 PER 100 WBC
PDW BLD-RTO: 11.5 % (ref 11.8–14.4)
PLATELET # BLD: 206 K/UL (ref 138–453)
PLATELET ESTIMATE: ABNORMAL
PMV BLD AUTO: 10.9 FL (ref 8.1–13.5)
POTASSIUM SERPL-SCNC: 4.1 MMOL/L (ref 3.7–5.3)
RBC # BLD: 4.72 M/UL (ref 3.95–5.11)
RBC # BLD: ABNORMAL 10*6/UL
SEG NEUTROPHILS: 63 % (ref 36–65)
SEGMENTED NEUTROPHILS ABSOLUTE COUNT: 3.56 K/UL (ref 1.5–8.1)
SODIUM BLD-SCNC: 140 MMOL/L (ref 135–144)
TOTAL IRON BINDING CAPACITY: 327 UG/DL (ref 250–450)
TOTAL PROTEIN: 7.3 G/DL (ref 6.4–8.3)
TRIGL SERPL-MCNC: 37 MG/DL
UNSATURATED IRON BINDING CAPACITY: 204 UG/DL (ref 112–347)
VLDLC SERPL CALC-MCNC: NORMAL MG/DL (ref 1–30)
WBC # BLD: 5.7 K/UL (ref 3.5–11.3)
WBC # BLD: ABNORMAL 10*3/UL

## 2021-11-29 ENCOUNTER — OFFICE VISIT (OUTPATIENT)
Dept: FAMILY MEDICINE CLINIC | Age: 31
End: 2021-11-29
Payer: MEDICARE

## 2021-11-29 VITALS
OXYGEN SATURATION: 97 % | SYSTOLIC BLOOD PRESSURE: 106 MMHG | RESPIRATION RATE: 16 BRPM | HEART RATE: 90 BPM | DIASTOLIC BLOOD PRESSURE: 70 MMHG | TEMPERATURE: 97 F

## 2021-11-29 DIAGNOSIS — R07.89 CHEST TIGHTNESS: ICD-10-CM

## 2021-11-29 DIAGNOSIS — R05.9 COUGH: Primary | ICD-10-CM

## 2021-11-29 PROCEDURE — 99213 OFFICE O/P EST LOW 20 MIN: CPT | Performed by: NURSE PRACTITIONER

## 2021-11-29 PROCEDURE — G8419 CALC BMI OUT NRM PARAM NOF/U: HCPCS | Performed by: NURSE PRACTITIONER

## 2021-11-29 PROCEDURE — G8427 DOCREV CUR MEDS BY ELIG CLIN: HCPCS | Performed by: NURSE PRACTITIONER

## 2021-11-29 PROCEDURE — G8484 FLU IMMUNIZE NO ADMIN: HCPCS | Performed by: NURSE PRACTITIONER

## 2021-11-29 PROCEDURE — 1036F TOBACCO NON-USER: CPT | Performed by: NURSE PRACTITIONER

## 2021-11-29 RX ORDER — ALBUTEROL SULFATE 90 UG/1
2 AEROSOL, METERED RESPIRATORY (INHALATION) EVERY 6 HOURS PRN
Qty: 18 G | Refills: 0 | Status: SHIPPED | OUTPATIENT
Start: 2021-11-29

## 2021-11-29 ASSESSMENT — PATIENT HEALTH QUESTIONNAIRE - PHQ9
SUM OF ALL RESPONSES TO PHQ QUESTIONS 1-9: 0
SUM OF ALL RESPONSES TO PHQ9 QUESTIONS 1 & 2: 0
2. FEELING DOWN, DEPRESSED OR HOPELESS: 0
1. LITTLE INTEREST OR PLEASURE IN DOING THINGS: 0
SUM OF ALL RESPONSES TO PHQ QUESTIONS 1-9: 0
SUM OF ALL RESPONSES TO PHQ QUESTIONS 1-9: 0

## 2021-11-29 ASSESSMENT — ENCOUNTER SYMPTOMS
CHEST TIGHTNESS: 1
RHINORRHEA: 0
VOMITING: 0
NAUSEA: 0
COUGH: 1
SORE THROAT: 0
EYE PAIN: 0
SHORTNESS OF BREATH: 0

## 2021-11-29 NOTE — PROGRESS NOTES
[address] [facility]  [address] [place]  Dept: [phone]  Dept Fax: [phone]    Mariella Cortes is a 26 y.o. female who presents today for her medical conditions/complaints of   Chief Complaint   Patient presents with    Cough     after working out and at night time when laying down, lasted since 10/26          HPI:     /70   Pulse 90   Temp 97 °F (36.1 °C)   Resp 16   SpO2 97%   Breastfeeding No       HPI  Pt presented to the clinic today with c/o cough- dry. This is a new problem. The current episode started 2 months ago. The problem occurs with working out. Associated symptoms include: chest tightness with cough . Pertinent negatives include: No fever, chills, SOB, abdominal pain, chest pain, heart burn, indigestion . Pt has tried Inhaler from son with little relief. History of allergies. No recent sickness.      Past Medical History:   Diagnosis Date    Constipation     and diarrhea - since first delivery    Elective  ,     Elevated homocysteine 2009    Fracture phalanges, hand age 19    Fx navicular, foot-closed age 13    Hemorrhoids     after delivery #1    Hx of postpartum depression, currently pregnant     Hx of seasonal allergies     previously taking Nasonex        Past Surgical History:   Procedure Laterality Date    INDUCED   ,     WISDOM TOOTH EXTRACTION         Family History   Problem Relation Age of Onset    Heart Disease Maternal Grandmother     Stroke Maternal Grandmother     Hypertension Maternal Grandmother     Stroke Maternal Grandfather     Heart Attack Maternal Grandfather     Hypertension Maternal Grandfather     Alcohol Abuse Father     Depression Father     Migraines Mother     Other Mother         elevated homocysteine level    Heart Disease Maternal Aunt     Hypertension Maternal Aunt     Heart Disease Maternal Uncle     Hypertension Maternal Uncle        Social History     Tobacco Use    Smoking status: Former Smoker     Packs/day: 0.25     Years: 3.00     Pack years: 0.75     Quit date: 2009     Years since quittin.9    Smokeless tobacco: Never Used   Substance Use Topics    Alcohol use: Yes     Alcohol/week: 0.0 standard drinks     Comment: social         Prior to Visit Medications    Medication Sig Taking? Authorizing Provider   albuterol sulfate HFA (PROVENTIL HFA) 108 (90 Base) MCG/ACT inhaler Inhale 2 puffs into the lungs every 6 hours as needed for Wheezing or Shortness of Breath Yes LEOLA Oden - CNP   Misc Natural Products (Parag Alba MSM FORMULA PO) Take by mouth Yes Historical Provider, MD   triamcinolone (ARISTOCORT) 0.5 % cream Apply topically 2 times daily. Yes LEOLA Linares CNP   ibuprofen (ADVIL;MOTRIN) 600 MG tablet Take 1 tablet by mouth every 6 hours as needed for Pain Yes Raegan Marrero, DO   Misc. Devices (BREAST PUMP) MISC 1 Pump by Does not apply route as needed (lactating mother) Yes LEOLA Hopkins CNM   MAGNESIUM PO Take 325 mg by mouth daily Yes Historical Provider, MD   b complex vitamins capsule Take 1 capsule by mouth daily Yes Historical Provider, MD   vitamin D (CHOLECALCIFEROL) 1000 UNIT TABS tablet Take 1 tablet by mouth daily Yes Historical Provider, MD   Ferrous Sulfate (IRON) 325 (65 Fe) MG TABS Take 325 mg by mouth daily Yes Historical Provider, MD [place] VIT-FE FUM-FA-FISH OIL PO Take 1 tablet by mouth daily Yes Historical Provider, MD       Allergies   Allergen Reactions    No Known Allergies          Subjective:      Review of Systems   Constitutional: Negative for chills and fever. HENT: Negative for congestion, ear pain, rhinorrhea and sore throat. Eyes: Negative for pain and visual disturbance. Respiratory: Positive for cough and chest tightness. Negative for shortness of breath.          With exercise    Cardiovascular: Negative for chest pain, palpitations and leg swelling. Gastrointestinal: Negative for nausea and vomiting. Genitourinary: Negative for decreased urine volume and difficulty urinating. Musculoskeletal: Negative for gait problem, myalgias and neck pain. Skin: Negative for pallor and rash. Neurological: Negative for weakness, light-headedness and headaches. Psychiatric/Behavioral: Negative for sleep disturbance. Objective:     Physical Exam  Vitals and nursing note reviewed. Constitutional:       General: She is not in acute distress. Appearance: Normal appearance. HENT:      Head: Normocephalic and atraumatic. Right Ear: Tympanic membrane and ear canal normal.      Left Ear: Tympanic membrane and ear canal normal.      Nose: Nose normal.      Mouth/Throat:      Lips: Pink. Mouth: Mucous membranes are moist.      Pharynx: Oropharynx is clear. Uvula midline. Eyes:      Extraocular Movements: Extraocular movements intact. Conjunctiva/sclera: Conjunctivae normal.   Cardiovascular:      Rate and Rhythm: Normal rate and regular rhythm. Pulses: Normal pulses. Pulmonary:      Effort: Pulmonary effort is normal. No tachypnea. Breath sounds: Normal breath sounds. No wheezing, rhonchi or rales. Abdominal:      General: Bowel sounds are normal.      Palpations: Abdomen is soft. Musculoskeletal:         General: Normal range of motion. Cervical back: Normal range of motion and neck supple. Skin:     General: Skin is warm and dry. Capillary Refill: Capillary refill takes less than 2 seconds. Coloration: Skin is not pale. Neurological:      Mental Status: She is alert and oriented to person, place, and time. Coordination: Coordination normal.      Gait: Gait normal.   Psychiatric:         Mood and Affect: Mood normal.         Thought Content: Thought content normal.           MEDICAL DECISION MAKING Assessment/Plan:     Zakia Hernandez was seen today for cough.     Diagnoses and all orders for this visit:    Cough  -     albuterol sulfate HFA (PROVENTIL HFA) 108 (90 Base) MCG/ACT inhaler; Inhale 2 puffs into the lungs every 6 hours as needed for Wheezing or Shortness of Breath    Chest tightness  -     albuterol sulfate HFA (PROVENTIL HFA) 108 (90 Base) MCG/ACT inhaler;  Inhale 2 puffs into the lungs every 6 hours as needed for Wheezing or Shortness of Breath        Results for orders placed or performed during the hospital encounter of 10/01/21   Lipid Panel   Result Value Ref Range    Cholesterol 168 <200 mg/dL    HDL 83 >40 mg/dL    LDL Cholesterol 78 0 - 130 mg/dL    Chol/HDL Ratio 2.0 <5    Triglycerides 37 <150 mg/dL    VLDL NOT REPORTED 1 - 30 mg/dL   Ferritin   Result Value Ref Range    Ferritin 29 13 - 150 ug/L   Iron And TIBC   Result Value Ref Range    Iron 123 37 - 145 ug/dL    TIBC 327 250 - 450 ug/dL    Iron Saturation 38 20 - 55 %    UIBC 204 112 - 347 ug/dL   Comprehensive Metabolic Panel   Result Value Ref Range    Glucose 79 70 - 99 mg/dL    BUN 8 6 - 20 mg/dL    CREATININE 0.60 0.50 - 0.90 mg/dL    Bun/Cre Ratio NOT REPORTED 9 - 20    Calcium 9.4 8.6 - 10.4 mg/dL    Sodium 140 135 - 144 mmol/L    Potassium 4.1 3.7 - 5.3 mmol/L    Chloride 104 98 - 107 mmol/L    CO2 23 20 - 31 mmol/L    Anion Gap 13 9 - 17 mmol/L    Alkaline Phosphatase 56 35 - 104 U/L    ALT 13 5 - 33 U/L    AST 16 <32 U/L    Total Bilirubin 1.35 (H) 0.3 - 1.2 mg/dL    Total Protein 7.3 6.4 - 8.3 g/dL    Albumin 4.6 3.5 - 5.2 g/dL    Albumin/Globulin Ratio 1.7 1.0 - 2.5    GFR Non-African American >60 >60 mL/min    GFR African American >60 >60 mL/min    GFR Comment          GFR Staging NOT REPORTED    CBC With Auto Differential   Result Value Ref Range    WBC 5.7 3.5 - 11.3 k/uL    RBC 4.72 3.95 - 5.11 m/uL    Hemoglobin 13.4 11.9 - 15.1 g/dL    Hematocrit 41.8 36.3 - 47.1 %    MCV 88.6 82.6 - 102.9 fL    MCH 28.4 25.2 - 33.5 pg    MCHC 32.1 28.4 - 34.8 g/dL    RDW 11.5 (L) 11.8 - 14.4 %    Platelets 206 138 - 453 k/uL    MPV 10.9 8.1 - 13.5 fL    NRBC Automated 0.0 0.0 per 100 WBC    Differential Type NOT REPORTED     Seg Neutrophils 63 36 - 65 %    Lymphocytes 26 24 - 43 %    Monocytes 8 3 - 12 %    Eosinophils % 2 1 - 4 %    Basophils 1 0 - 2 %    Immature Granulocytes 0 0 %    Segs Absolute 3.56 1.50 - 8.10 k/uL    Absolute Lymph # 1.47 1.10 - 3.70 k/uL    Absolute Mono # 0.46 0.10 - 1.20 k/uL    Absolute Eos # 0.11 0.00 - 0.44 k/uL    Basophils Absolute 0.04 0.00 - 0.20 k/uL    Absolute Immature Granulocyte <0.03 0.00 - 0.30 k/uL    WBC Morphology NOT REPORTED     RBC Morphology NOT REPORTED     Platelet Estimate NOT REPORTED      Based on history and exam, will trial albuterol inhaler 20 min prior to working out. Possible exercise induced asthma. Advised to follow up with PCP if not improving. Go to the ER for any emergent concern. Patient given educational materials - see patientinstructions. Discussed use, benefit, and side effects of prescribed medications. All patient questions answered. Pt verbalized understanding. Instructed to continue current medications, diet and exercise. Patient agreed with treatment plan. Follow up as directed.      Electronically signed by LEOLA Green CNP on 11/29/2021 at 6:52 PM

## 2022-01-23 ENCOUNTER — APPOINTMENT (OUTPATIENT)
Dept: CT IMAGING | Facility: CLINIC | Age: 32
End: 2022-01-23
Payer: MEDICARE

## 2022-01-23 ENCOUNTER — HOSPITAL ENCOUNTER (EMERGENCY)
Facility: CLINIC | Age: 32
Discharge: HOME | End: 2022-01-23
Attending: EMERGENCY MEDICINE
Payer: MEDICARE

## 2022-01-23 VITALS
BODY MASS INDEX: 25.83 KG/M2 | SYSTOLIC BLOOD PRESSURE: 126 MMHG | OXYGEN SATURATION: 97 % | TEMPERATURE: 98.2 F | HEART RATE: 87 BPM | RESPIRATION RATE: 17 BRPM | WEIGHT: 155 LBS | DIASTOLIC BLOOD PRESSURE: 80 MMHG | HEIGHT: 65 IN

## 2022-01-23 DIAGNOSIS — S09.90XA CLOSED HEAD INJURY, INITIAL ENCOUNTER: Primary | ICD-10-CM

## 2022-01-23 PROCEDURE — 70450 CT HEAD/BRAIN W/O DYE: CPT

## 2022-01-23 PROCEDURE — 72125 CT NECK SPINE W/O DYE: CPT

## 2022-01-23 PROCEDURE — 99282 EMERGENCY DEPT VISIT SF MDM: CPT

## 2022-01-23 NOTE — ED TRIAGE NOTES
Pt was walking at [facility] , she slipped and struck her head, pt denies LOC 15 min ago-  Pt c/o right shoulder pain, PMS intact , pt denies neck pain

## 2022-01-23 NOTE — ED PROVIDER NOTES
[address] [facility] E ED  eMERGENCY dEPARTMENT eNCOUnter      Pt Name: Benita Ramirez  MRN: [id number]  Terrellgfkristine 1990  Date of evaluation: 2022  Provider: LEOLA Bryan [id number]       Chief Complaint   Patient presents with    Head Injury     pt slipped on ice, onset 15 min ago, pt denies LOCL>        HISTORY OF PRESENT ILLNESS  (Location/Symptom, Timing/Onset, Context/Setting, Quality, Duration, Modifying Factors, Severity.)   Benita Ramirez is a 33 y.o. female who presents to the emergency department for evaluation after slip and fall on the ice. She states that she was walking when her feet slipped out from underneath her and she fell backwards onto her back smacking her head on the ice. She states initially she was dizzy, nauseated, and felt as if she was leaning to the right when she was initially walking after the fall. She currently reports mild posterior headache and neck pain. She denies reports of LOC, vision changes, chest pain, palpitations, shortness of breath, abdominal pain, vomiting. She does not take blood thinners. Nursing Notes were reviewed. REVIEW OF SYSTEMS    (2-9 systems for level 4, 10 or more for level 5)     Constitutional:  Denies recent fever, chills  HEENT:   Reports posterior head pain  Neck:   Reports neck pain  Respiratory:  Denies recent shortness of breath. Cardiac:  Denies recent chest pain. GI: Reports nausea denies abdominal pain, vomiting, diarrhea  : Denies dysuria. Musculoskeletal: Denies back or extremity pain  Neurologic: Ports dizziness  Skin:   Denies any rash. Negative in 10 essential Systems except as mentioned above and in the HPI. PAST MEDICAL HISTORY   History reviewed.         Diagnosis Date    Constipation     and diarrhea - since first delivery    Elective  ,     Elevated homocysteine     Fracture phalanges, hand age 19    Fx navicular, foot-closed age 14    Hemorrhoids     after delivery #1    Hx of postpartum depression, currently pregnant     Hx of seasonal allergies     previously taking Nasonex         SURGICAL HISTORY           Procedure Laterality Date    INDUCED   ,     WISDOM TOOTH EXTRACTION         CURRENT MEDICATIONS       Previous Medications    ALBUTEROL SULFATE HFA (PROVENTIL HFA) 108 (90 BASE) MCG/ACT INHALER    Inhale 2 puffs into the lungs every 6 hours as needed for Wheezing or Shortness of Breath    B COMPLEX VITAMINS CAPSULE    Take 1 capsule by mouth daily    FERROUS SULFATE (IRON) 325 (65 FE) MG TABS    Take 325 mg by mouth daily    IBUPROFEN (ADVIL;MOTRIN) 600 MG TABLET    Take 1 tablet by mouth every 6 hours as needed for Pain    MAGNESIUM PO    Take 325 mg by mouth daily    MISC NATURAL PRODUCTS (GLUCOSAMINE CHOND MSM FORMULA PO)    Take by mouth    MISC. DEVICES (BREAST PUMP) MISC    1 Pump by Does not apply route as needed (lactating mother)    PRENAT VIT-FE FUM-FA-FISH OIL PO    Take 1 tablet by mouth daily    TRIAMCINOLONE (ARISTOCORT) 0.5 % CREAM    Apply topically 2 times daily.     VITAMIN D (CHOLECALCIFEROL) 1000 UNIT TABS TABLET    Take 1 tablet by mouth daily       ALLERGIES     No known allergies    FAMILY HISTORY           Problem Relation Age of Onset    Heart Disease Maternal Grandmother     Stroke Maternal Grandmother     Hypertension Maternal Grandmother     Stroke Maternal Grandfather     Heart Attack Maternal Grandfather     Hypertension Maternal Grandfather     Alcohol Abuse Father     Depression Father     Migraines Mother     Other Mother         elevated homocysteine level    Heart Disease Maternal Aunt     Hypertension Maternal Aunt     Heart Disease Maternal Uncle     Hypertension Maternal Uncle      Family Status   Relation Name Status    MGM      MGF      Father  Other    Mother  Alive    MAunt  (Not Specified)    MUnc  (Not Specified)        SOCIAL HISTORY reports that she quit smoking about 13 years ago. She has a 0.75 pack-year smoking history. She has never used smokeless tobacco. She reports current alcohol use. She reports that she does not use drugs. Lives with others. PHYSICAL EXAM    (up to 7 for level 4, 8 or more for level 5)     ED Triage Vitals   BP Temp Temp src Pulse Resp SpO2 Height Weight   01/23/22 1226 01/23/22 1226 -- 01/23/22 1226 01/23/22 1227 01/23/22 1222 01/23/22 1222 01/23/22 1222   126/80 98.2 °F (36.8 °C)  87 17 97 % 5' 5\" (1.651 m) 155 lb (70.3 kg)       Constitutional:  Well developed   Eyes:  Pupils equal and readily reactive to light  HENT:  Atraumatic, External ears normal, Nose normal, Oropharnx moist.     Neck: No midline pain or tenderness, no step-off or deformities palpated, full ROM, bilateral paraspinal tenderness extending across bilateral shoulders,   Respiratory:  Clear to auscultation bilaterally with good air exchange, no W/R/R  Cardiovascular:  RRR with normal S1 and S2  Gastrointestinal/Abdomen:  Soft, NT, BS present. Musculoskeletal:  No edema, no tenderness, no deformities. Back: No midline pain. No step-off deformity, no swelling, no bruising. No CVA tenderness. Normal to inspection. Integument:  No rash.     Neurologic:  Alert & oriented x 3, no focal deficits noted     DIAGNOSTIC RESULTS     EKG: All EKG's are interpreted by the Emergency Department Physician who either signs or Co-signs this chart in the absence of a cardiologist.  Not indicated    RADIOLOGY:   Non-plain film images such as CT, Ultrasound and MRI are read by the radiologist. Plain radiographic images are visualized and preliminarily interpreted by the emergency physician with the below findings:  CT HEAD WO CONTRAST    Result Date: 1/23/2022  EXAMINATION: CT OF THE HEAD WITHOUT CONTRAST; CT OF THE CERVICAL SPINE WITHOUT CONTRAST 1/23/2022 12:48 pm; 1/23/2022 12:49 pm TECHNIQUE: CT of the head was performed without the administration of paranasal sinuses and mastoid air cells demonstrate no acute abnormality. SOFT TISSUES/SKULL: No acute abnormality of the visualized skull or soft tissues. CT cervical spine: BONES/ALIGNMENT: Mild reversal of the cervical lordosis centered at C5. Minimal hypertrophic osteophyte spur formation in the anterior cervical spine. Gross preservation of the vertebral body heights and intervertebral disc spaces. Cervical spine is imaged from the skull base to the inferior endplate of T2 on the sagittal reconstructions. Facets are in proper alignment. Odontoid appears intact. Lateral masses symmetric in appearance. Occipital condyles articulate properly with the lateral masses. DEGENERATIVE CHANGES: No significant degenerative changes. SOFT TISSUES: There is no prevertebral soft tissue swelling. CT head: No acute intracranial abnormality evident by CT. CT cervical spine: 1. Mild reversal of the cervical lordosis centered at C5. 2.  No acute vertebral body height loss within the cervical spine. Interpretation per the Radiologist below, if available at the time of this note:    CT CERVICAL SPINE WO CONTRAST   Final Result   CT head:      No acute intracranial abnormality evident by CT. CT cervical spine:      1. Mild reversal of the cervical lordosis centered at C5.      2.  No acute vertebral body height loss within the cervical spine. CT HEAD WO CONTRAST   Final Result   CT head:      No acute intracranial abnormality evident by CT. CT cervical spine:      1. Mild reversal of the cervical lordosis centered at C5.      2.  No acute vertebral body height loss within the cervical spine. LABS:  Labs Reviewed - No data to display  Not indicated    All other labs were within normal range or not returned as of this dictation.     EMERGENCY DEPARTMENT COURSE and DIFFERENTIAL DIAGNOSIS/MDM:   Vitals:    Vitals:    01/23/22 1222 01/23/22 1226 01/23/22 1227   BP:  126/80    Pulse:  87 Resp:   17   Temp:  98.2 °F (36.8 °C)    SpO2: 97% 97%    Weight: 70.3 kg (155 lb)     Height: 5' 5\" (1.651 m)       Upon evaluation patient is sitting on stretcher alert and oriented in no acute distress. She continues to complain of mild dizziness although denies nausea present time. We will obtain CT head and cervical spine to rule out intracranial process and acute osseous abnormalities. 1432: CT results indicate CT head: No acute intracranial abnormality evident by CT. CT cervical spine: 1. Mild reversal of the cervical lordosis centered at C5. 2.  No acute vertebral body height loss within the cervical spine. The patient presents with a closed head injury. The patient is neurologically intact. The presentation does not suggest a serious head injury. Signs and symptoms of a serious head injury have been discussed with the patient and caregiver, who will be vigilant for these. Concerns of repeat head injury have also been discussed. The patient has been observed adequately in the ED. Pt has been instructed to return to the ED if symptoms do not go away or worsen or change in any way. The patient understands that at this time there is no evidence for a more malignant underlying process, but the patient also understands that early in the process of an illness or injury, an emergency department workup can be falsely reassuring. Routine discharge counseling was given, and the patient understands that worsening, changing or persistent symptoms should prompt an immediate call or follow up with their primary physician or return to the emergency department. The importance of appropriate follow up was also discussed. I have reviewed the disposition diagnosis with the patient and or their family/guardian. I have answered their questions and given discharge instructions.   They voiced understanding of these instructions and did not have any further questions or complaints. CONSULTS:  None    PROCEDURES:  None    FINAL IMPRESSION      1.  Closed head injury, initial encounter          DISPOSITION/PLAN   DISPOSITION Decision To Discharge 01/23/2022 02:35:04 PM      PATIENT REFERRED TO:  LEOLA Degroot CNP  [address] [place] [address] Dr Fowler [address] [place] [address]  [phone]    Call in 2 days      [facility]  / [place]   [phone]    As needed      DISCHARGE MEDICATIONS:  New Prescriptions    No medications on file       (Please note that portions of this note were completed with a voice recognition program.  Efforts were made to edit the dictations but occasionally words are mis-transcribed.)    LEOLA Ken CNP, APRN - CNP  01/23/22 0790

## 2022-01-23 NOTE — ED PROVIDER NOTES
Emergency Department           COMPLAINT       Chief Complaint   Patient presents with    Head Injury     pt slipped on ice, onset 15 min ago, pt denies LOCL>       PHYSICAL EXAM      ED Triage Vitals   BP Temp Temp src Pulse Resp SpO2 Height Weight   01/23/22 1226 01/23/22 1226 -- 01/23/22 1226 01/23/22 1227 01/23/22 1222 01/23/22 1222 01/23/22 1222   126/80 98.2 °F (36.8 °C)  87 17 97 % 5' 5\" (1.651 m) 155 lb (70.3 kg)         Constitutional: Alert, oriented x3, nontoxic, answering questions appropriately, acting properly for age, in no acute distress   HEENT: Extraocular muscles intact,  Pupils equal, round, reactive to light, no photophobia  Neck: Trachea midline no meningismus no posterior midline neck tenderness palpation tenderness C6 paraspinal right greater than left with decreased range of motion  Cardiovascular: Regular rhythm and rate no murmurs   Respiratory: Clear to auscultation bilaterally no wheezes, rhonchi, rales, no respiratory distress no tachypnea no retractions no hypoxia  Gastrointestinal: Soft, nontender, nondistended, positive bowel sounds. No rebound, rigidity, or guarding. Musculoskeletal: No extremity pain or swelling   Neurologic: Moving all 4 extremities without difficulty there are no gross focal neurologic deficits finger-to-nose normal.   strength is 5/5 bilaterally. Skin: Warm and dry       Physical Exam  DIAGNOSTIC RESULTS     EKG: All EKG's are interpreted by the Emergency Department Physician who either signs or Co-signs this chart in the absence of a cardiologist.    Not indicated unless otherwise documented above or in the midlevel documentation    LABS:  No results found for this visit on 01/23/22. Not indicated unless otherwise documented above or in the midlevel documentation    RADIOLOGY:   I reviewedthe radiologist interpretations:  CT CERVICAL SPINE WO CONTRAST   Preliminary Result   CT head:      No acute intracranial abnormality evident by CT. CT cervical spine:      1. Mild reversal of the cervical lordosis centered at C5.      2.  No acute vertebral body height loss within the cervical spine. CT HEAD WO CONTRAST   Preliminary Result   CT head:      No acute intracranial abnormality evident by CT. CT cervical spine:      1. Mild reversal of the cervical lordosis centered at C5.      2.  No acute vertebral body height loss within the cervical spine. Not indicated unless otherwise documented above or in the midlevel documentation    EMERGENCY DEPARTMENT COURSE:       PERTINENT ATTENDING PHYSICIAN COMMENTS:    Slip and fall on ice struck the back of her head was dazed initially and having trouble with balance that is now improved if not resolved. Denies blurry vision or double vision. Complaining of some mild neck pain as well. CT head and cervical spine. 2:35 PM CT of the head and cervical spine unremarkable. Close head injury. Follow-up with family physician for reevaluation return if worsening symptoms or other concerns. Faculty Attestation    I performed a history and physical examination of the patient and discussed management with the mid level provideer. I reviewed the mid level provider's note and agree with the documented findings and plan of care. Any areas of disagreement are noted on the chart. I was personally present for the key portions of any procedures. I have documented in the chart those procedures where I was not present during the key portions. I have reviewed the emergency nurses triage note. I agree with the chief complaint, past medical history, past surgical history, allergies, medications, social and family history as documented unless otherwise noted below. Documentation of the HPI, Physical Exam and Medical Decision Making performed by medical students or scribes is based on my personal performance of the HPI, PE and MDM.  For Physician Assistant/ Nurse Practitioner cases/documentation I have personally evaluated this patient and have completed at least one if not all key elements of the E/M (history, physical exam, and MDM). Additional findings are as noted.        Kerri Vasques DO  01/23/22 0702

## 2022-09-16 ENCOUNTER — OFFICE VISIT (OUTPATIENT)
Dept: PRIMARY CARE CLINIC | Age: 32
End: 2022-09-16
Payer: MEDICARE

## 2022-09-16 VITALS
HEART RATE: 86 BPM | OXYGEN SATURATION: 98 % | SYSTOLIC BLOOD PRESSURE: 94 MMHG | HEIGHT: 64 IN | WEIGHT: 150.25 LBS | DIASTOLIC BLOOD PRESSURE: 60 MMHG | BODY MASS INDEX: 25.65 KG/M2

## 2022-09-16 DIAGNOSIS — J30.89 NON-SEASONAL ALLERGIC RHINITIS, UNSPECIFIED TRIGGER: ICD-10-CM

## 2022-09-16 DIAGNOSIS — Z13.6 SCREENING FOR CARDIOVASCULAR CONDITION: ICD-10-CM

## 2022-09-16 DIAGNOSIS — M79.601 PAIN, ARM, RIGHT: ICD-10-CM

## 2022-09-16 DIAGNOSIS — N92.0 MENORRHAGIA WITH REGULAR CYCLE: ICD-10-CM

## 2022-09-16 DIAGNOSIS — Z13.29 SCREENING FOR THYROID DISORDER: ICD-10-CM

## 2022-09-16 DIAGNOSIS — Z13.0 SCREENING FOR DEFICIENCY ANEMIA: ICD-10-CM

## 2022-09-16 DIAGNOSIS — Z00.00 ANNUAL PHYSICAL EXAM: Primary | ICD-10-CM

## 2022-09-16 DIAGNOSIS — L29.9 EAR ITCHING: ICD-10-CM

## 2022-09-16 DIAGNOSIS — M54.9 UPPER BACK PAIN ON RIGHT SIDE: ICD-10-CM

## 2022-09-16 DIAGNOSIS — M25.511 PAIN OF RIGHT SHOULDER REGION: ICD-10-CM

## 2022-09-16 PROCEDURE — 99395 PREV VISIT EST AGE 18-39: CPT | Performed by: NURSE PRACTITIONER

## 2022-09-16 RX ORDER — FLUTICASONE PROPIONATE 50 MCG
2 SPRAY, SUSPENSION (ML) NASAL DAILY
Qty: 48 G | Refills: 5 | Status: SHIPPED | OUTPATIENT
Start: 2022-09-16

## 2022-09-16 SDOH — ECONOMIC STABILITY: FOOD INSECURITY: WITHIN THE PAST 12 MONTHS, YOU WORRIED THAT YOUR FOOD WOULD RUN OUT BEFORE YOU GOT MONEY TO BUY MORE.: NEVER TRUE

## 2022-09-16 SDOH — ECONOMIC STABILITY: FOOD INSECURITY: WITHIN THE PAST 12 MONTHS, THE FOOD YOU BOUGHT JUST DIDN'T LAST AND YOU DIDN'T HAVE MONEY TO GET MORE.: NEVER TRUE

## 2022-09-16 ASSESSMENT — ENCOUNTER SYMPTOMS
BACK PAIN: 1
ABDOMINAL PAIN: 0
SHORTNESS OF BREATH: 0
BLOOD IN STOOL: 0
VOMITING: 0
NAUSEA: 0
SINUS PRESSURE: 0
COUGH: 0
DIARRHEA: 0
TROUBLE SWALLOWING: 0
WHEEZING: 0
SORE THROAT: 0
CONSTIPATION: 1

## 2022-09-16 ASSESSMENT — PATIENT HEALTH QUESTIONNAIRE - PHQ9
SUM OF ALL RESPONSES TO PHQ QUESTIONS 1-9: 0
DEPRESSION UNABLE TO ASSESS: FUNCTIONAL CAPACITY MOTIVATION LIMITS ACCURACY
2. FEELING DOWN, DEPRESSED OR HOPELESS: 0
SUM OF ALL RESPONSES TO PHQ QUESTIONS 1-9: 0
1. LITTLE INTEREST OR PLEASURE IN DOING THINGS: 0
SUM OF ALL RESPONSES TO PHQ QUESTIONS 1-9: 0
SUM OF ALL RESPONSES TO PHQ9 QUESTIONS 1 & 2: 0
SUM OF ALL RESPONSES TO PHQ QUESTIONS 1-9: 0

## 2022-09-16 ASSESSMENT — SOCIAL DETERMINANTS OF HEALTH (SDOH): HOW HARD IS IT FOR YOU TO PAY FOR THE VERY BASICS LIKE FOOD, HOUSING, MEDICAL CARE, AND HEATING?: NOT HARD AT ALL
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
## 2023-10-03 ENCOUNTER — OFFICE VISIT (OUTPATIENT)
Dept: PRIMARY CARE CLINIC | Age: 33
End: 2023-10-03
Payer: COMMERCIAL

## 2023-10-03 ENCOUNTER — HOSPITAL ENCOUNTER (OUTPATIENT)
Age: 33
Setting detail: SPECIMEN
Discharge: HOME | End: 2023-10-03

## 2023-10-03 VITALS
HEIGHT: 64 IN | WEIGHT: 150.2 LBS | SYSTOLIC BLOOD PRESSURE: 100 MMHG | DIASTOLIC BLOOD PRESSURE: 62 MMHG | HEART RATE: 71 BPM | BODY MASS INDEX: 25.64 KG/M2 | OXYGEN SATURATION: 98 %

## 2023-10-03 DIAGNOSIS — Z00.00 ANNUAL PHYSICAL EXAM: Primary | ICD-10-CM

## 2023-10-03 DIAGNOSIS — Z13.0 SCREENING FOR DEFICIENCY ANEMIA: ICD-10-CM

## 2023-10-03 DIAGNOSIS — M25.532 PAIN, WRIST, LEFT: ICD-10-CM

## 2023-10-03 DIAGNOSIS — R07.89 CHEST TIGHTNESS: ICD-10-CM

## 2023-10-03 DIAGNOSIS — Z13.6 SCREENING FOR CARDIOVASCULAR CONDITION: ICD-10-CM

## 2023-10-03 DIAGNOSIS — R05.3 CHRONIC COUGH: ICD-10-CM

## 2023-10-03 LAB
ALBUMIN SERPL-MCNC: 4.3 G/DL (ref 3.5–5.2)
ALBUMIN/GLOB SERPL: 2 {RATIO}
ALP SERPL-CCNC: 50 U/L (ref 35–104)
ALT SERPL-CCNC: 7 U/L (ref 10–35)
ANION GAP SERPL CALCULATED.3IONS-SCNC: 7 MMOL/L (ref 9–16)
AST SERPL-CCNC: 19 U/L (ref 10–35)
BASOPHILS # BLD: 0.05 K/UL (ref 0–0.2)
BASOPHILS NFR BLD: 1 % (ref 0–2)
BILIRUB SERPL-MCNC: 1 MG/DL (ref 0–1.2)
BUN SERPL-MCNC: 12 MG/DL (ref 6–20)
CALCIUM SERPL-MCNC: 9.1 MG/DL (ref 8.6–10.4)
CHLORIDE SERPL-SCNC: 101 MMOL/L (ref 98–107)
CHOLEST SERPL-MCNC: 155 MG/DL (ref 0–199)
CHOLESTEROL/HDL RATIO: 2
CO2 SERPL-SCNC: 26 MMOL/L (ref 20–31)
CREAT SERPL-MCNC: 0.7 MG/DL (ref 0.5–0.9)
EOSINOPHIL # BLD: 0.19 K/UL (ref 0–0.44)
EOSINOPHILS RELATIVE PERCENT: 3 % (ref 1–4)
ERYTHROCYTE [DISTWIDTH] IN BLOOD BY AUTOMATED COUNT: 12.7 % (ref 11.8–14.4)
GFR SERPL CREATININE-BSD FRML MDRD: >60 ML/MIN/1.73M2
GLUCOSE SERPL-MCNC: 78 MG/DL (ref 74–99)
HCT VFR BLD AUTO: 40.1 % (ref 36.3–47.1)
HDLC SERPL-MCNC: 72 MG/DL
HGB BLD-MCNC: 12.5 G/DL (ref 11.9–15.1)
IMM GRANULOCYTES # BLD AUTO: <0.03 K/UL (ref 0–0.3)
IMM GRANULOCYTES NFR BLD: 0 %
LDLC SERPL CALC-MCNC: 68 MG/DL (ref 0–100)
LYMPHOCYTES NFR BLD: 2.23 K/UL (ref 1.1–3.7)
LYMPHOCYTES RELATIVE PERCENT: 30 % (ref 24–43)
MCH RBC QN AUTO: 27.4 PG (ref 25.2–33.5)
MCHC RBC AUTO-ENTMCNC: 31.2 G/DL (ref 28.4–34.8)
MCV RBC AUTO: 87.9 FL (ref 82.6–102.9)
MONOCYTES NFR BLD: 0.59 K/UL (ref 0.1–1.2)
MONOCYTES NFR BLD: 8 % (ref 3–12)
NEUTROPHILS NFR BLD: 58 % (ref 36–65)
NEUTS SEG NFR BLD: 4.29 K/UL (ref 1.5–8.1)
NRBC BLD-RTO: 0 PER 100 WBC
PLATELET # BLD AUTO: 233 K/UL (ref 138–453)
PMV BLD AUTO: 10.8 FL (ref 8.1–13.5)
POTASSIUM SERPL-SCNC: 3.8 MMOL/L (ref 3.7–5.3)
PROT SERPL-MCNC: 6.9 G/DL (ref 6.6–8.7)
RBC # BLD AUTO: 4.56 M/UL (ref 3.95–5.11)
SODIUM SERPL-SCNC: 134 MMOL/L (ref 136–145)
TRIGL SERPL-MCNC: 76 MG/DL (ref 0–149)
VLDLC SERPL CALC-MCNC: 15 MG/DL
WBC OTHER # BLD: 7.4 K/UL (ref 3.5–11.3)

## 2023-10-03 PROCEDURE — 99395 PREV VISIT EST AGE 18-39: CPT | Performed by: NURSE PRACTITIONER

## 2023-10-03 PROCEDURE — G8484 FLU IMMUNIZE NO ADMIN: HCPCS | Performed by: NURSE PRACTITIONER

## 2023-10-03 RX ORDER — ALBUTEROL SULFATE 90 UG/1
2 AEROSOL, METERED RESPIRATORY (INHALATION) EVERY 6 HOURS PRN
Qty: 18 G | Refills: 0 | Status: SHIPPED | OUTPATIENT
Start: 2023-10-03

## 2023-10-03 SDOH — ECONOMIC STABILITY: HOUSING INSECURITY
IN THE LAST 12 MONTHS, WAS THERE A TIME WHEN YOU DID NOT HAVE A STEADY PLACE TO SLEEP OR SLEPT IN A SHELTER (INCLUDING NOW)?: NO

## 2023-10-03 SDOH — ECONOMIC STABILITY: FOOD INSECURITY: WITHIN THE PAST 12 MONTHS, THE FOOD YOU BOUGHT JUST DIDN'T LAST AND YOU DIDN'T HAVE MONEY TO GET MORE.: NEVER TRUE

## 2023-10-03 SDOH — ECONOMIC STABILITY: INCOME INSECURITY: HOW HARD IS IT FOR YOU TO PAY FOR THE VERY BASICS LIKE FOOD, HOUSING, MEDICAL CARE, AND HEATING?: NOT HARD AT ALL

## 2023-10-03 SDOH — ECONOMIC STABILITY: FOOD INSECURITY: WITHIN THE PAST 12 MONTHS, YOU WORRIED THAT YOUR FOOD WOULD RUN OUT BEFORE YOU GOT MONEY TO BUY MORE.: NEVER TRUE

## 2023-10-03 ASSESSMENT — ENCOUNTER SYMPTOMS
SORE THROAT: 0
SINUS PRESSURE: 0
BLOOD IN STOOL: 0
VOMITING: 0
DIARRHEA: 0
NAUSEA: 0
COUGH: 0
TROUBLE SWALLOWING: 0
WHEEZING: 0
ABDOMINAL PAIN: 0
SHORTNESS OF BREATH: 0
CONSTIPATION: 0

## 2023-10-03 ASSESSMENT — PATIENT HEALTH QUESTIONNAIRE - PHQ9
2. FEELING DOWN, DEPRESSED OR HOPELESS: 0
SUM OF ALL RESPONSES TO PHQ QUESTIONS 1-9: 0
SUM OF ALL RESPONSES TO PHQ QUESTIONS 1-9: 0
SUM OF ALL RESPONSES TO PHQ9 QUESTIONS 1 & 2: 0
SUM OF ALL RESPONSES TO PHQ QUESTIONS 1-9: 0
SUM OF ALL RESPONSES TO PHQ QUESTIONS 1-9: 0
1. LITTLE INTEREST OR PLEASURE IN DOING THINGS: 0

## 2023-10-03 NOTE — PROGRESS NOTES
Relation Age of Onset    Heart Disease Maternal Grandmother     Stroke Maternal Grandmother     Hypertension Maternal Grandmother     Stroke Maternal Grandfather     Heart Attack Maternal Grandfather     Hypertension Maternal Grandfather     Alcohol Abuse Father     Depression Father     Migraines Mother     Other Mother         elevated homocysteine level    Heart Disease Maternal Aunt     Hypertension Maternal Aunt     Heart Disease Maternal Uncle     Hypertension Maternal Uncle           Social History     Tobacco Use    Smoking status: Former     Packs/day: 0.25     Years: 3.00     Additional pack years: 0.00     Total pack years: 0.75     Types: Cigarettes     Quit date: 2009     Years since quittin.7    Smokeless tobacco: Never   Substance Use Topics    Alcohol use: Yes     Alcohol/week: 0.0 standard drinks of alcohol     Comment: social       Current Outpatient Medications   Medication Sig Dispense Refill    albuterol sulfate HFA (PROVENTIL HFA) 108 (90 Base) MCG/ACT inhaler Inhale 2 puffs into the lungs every 6 hours as needed for Wheezing or Shortness of Breath 18 g 0    fluticasone (FLONASE) 50 MCG/ACT nasal spray 2 sprays by Each Nostril route daily 48 g 5    Misc Natural Products (GLUCOSAMINE CHOND MSM FORMULA PO) Take by mouth      MAGNESIUM PO Take 325 mg by mouth daily      b complex vitamins capsule Take 1 capsule by mouth daily      vitamin D (CHOLECALCIFEROL) 1000 UNIT TABS tablet Take 1 tablet by mouth daily      Ferrous Sulfate (IRON) 325 (65 Fe) MG TABS Take 325 mg by mouth daily      PRENAT VIT-FE FUM-FA-FISH OIL PO Take 1 tablet by mouth daily      Misc. Devices (BREAST PUMP) MISC 1 Pump by Does not apply route as needed (lactating mother) 1 each 0     No current facility-administered medications for this visit.      Allergies   Allergen Reactions    No Known Allergies        Health Maintenance   Topic Date Due    Hepatitis B vaccine (1 of 3 - 3-dose series) Never done    Varicella

## 2023-10-12 ENCOUNTER — HOSPITAL ENCOUNTER (OUTPATIENT)
Age: 33
Setting detail: SPECIMEN
Discharge: HOME | End: 2023-10-12

## 2023-10-12 ENCOUNTER — OFFICE VISIT (OUTPATIENT)
Dept: OBGYN CLINIC | Age: 33
End: 2023-10-12
Payer: COMMERCIAL

## 2023-10-12 VITALS
DIASTOLIC BLOOD PRESSURE: 66 MMHG | WEIGHT: 150 LBS | HEIGHT: 64 IN | SYSTOLIC BLOOD PRESSURE: 118 MMHG | BODY MASS INDEX: 25.61 KG/M2

## 2023-10-12 DIAGNOSIS — Z01.419 WELL WOMAN EXAM WITH ROUTINE GYNECOLOGICAL EXAM: Primary | ICD-10-CM

## 2023-10-12 PROCEDURE — 99385 PREV VISIT NEW AGE 18-39: CPT | Performed by: ADVANCED PRACTICE MIDWIFE

## 2023-10-12 PROCEDURE — G8484 FLU IMMUNIZE NO ADMIN: HCPCS | Performed by: ADVANCED PRACTICE MIDWIFE

## 2023-10-12 ASSESSMENT — ENCOUNTER SYMPTOMS
SHORTNESS OF BREATH: 0
VOMITING: 0
DIARRHEA: 0
ABDOMINAL PAIN: 0
NAUSEA: 0

## 2023-10-14 LAB
HPV I/H RISK 4 DNA CVX QL NAA+PROBE: NOT DETECTED
HPV SAMPLE: NORMAL
HPV, INTERPRETATION: NORMAL
HPV16 DNA CVX QL NAA+PROBE: NOT DETECTED
HPV18 DNA CVX QL NAA+PROBE: NOT DETECTED
SPECIMEN DESCRIPTION: NORMAL

## 2023-10-21 LAB — CYTOLOGY REPORT: NORMAL

## 2024-01-23 ENCOUNTER — TELEPHONE (OUTPATIENT)
Dept: OBGYN CLINIC | Age: 34
End: 2024-01-23

## 2024-01-23 ENCOUNTER — OFFICE VISIT (OUTPATIENT)
Dept: OBGYN CLINIC | Age: 34
End: 2024-01-23
Payer: COMMERCIAL

## 2024-01-23 VITALS
BODY MASS INDEX: 26.5 KG/M2 | DIASTOLIC BLOOD PRESSURE: 74 MMHG | SYSTOLIC BLOOD PRESSURE: 116 MMHG | WEIGHT: 155.2 LBS | HEIGHT: 64 IN

## 2024-01-23 DIAGNOSIS — N64.4 BREAST PAIN, LEFT: ICD-10-CM

## 2024-01-23 DIAGNOSIS — Z80.3 FAMILY HISTORY OF MALIGNANT NEOPLASM OF BREAST IN RELATIVE DIAGNOSED WHEN YOUNGER THAN 45 YEARS OF AGE: Primary | ICD-10-CM

## 2024-01-23 DIAGNOSIS — R23.4 BREAST SKIN CHANGES: ICD-10-CM

## 2024-01-23 DIAGNOSIS — R23.4 BREAST SKIN CHANGES: Primary | ICD-10-CM

## 2024-01-23 DIAGNOSIS — Z80.3 FAMILY HISTORY OF MALIGNANT NEOPLASM OF BREAST IN RELATIVE DIAGNOSED WHEN YOUNGER THAN 45 YEARS OF AGE: ICD-10-CM

## 2024-01-23 PROCEDURE — 1036F TOBACCO NON-USER: CPT | Performed by: NURSE PRACTITIONER

## 2024-01-23 PROCEDURE — G8484 FLU IMMUNIZE NO ADMIN: HCPCS | Performed by: NURSE PRACTITIONER

## 2024-01-23 PROCEDURE — G8427 DOCREV CUR MEDS BY ELIG CLIN: HCPCS | Performed by: NURSE PRACTITIONER

## 2024-01-23 PROCEDURE — G8419 CALC BMI OUT NRM PARAM NOF/U: HCPCS | Performed by: NURSE PRACTITIONER

## 2024-01-23 PROCEDURE — 99214 OFFICE O/P EST MOD 30 MIN: CPT | Performed by: NURSE PRACTITIONER

## 2024-01-23 SDOH — ECONOMIC STABILITY: INCOME INSECURITY: HOW HARD IS IT FOR YOU TO PAY FOR THE VERY BASICS LIKE FOOD, HOUSING, MEDICAL CARE, AND HEATING?: NOT HARD AT ALL

## 2024-01-23 SDOH — ECONOMIC STABILITY: FOOD INSECURITY: WITHIN THE PAST 12 MONTHS, YOU WORRIED THAT YOUR FOOD WOULD RUN OUT BEFORE YOU GOT MONEY TO BUY MORE.: NEVER TRUE

## 2024-01-23 SDOH — ECONOMIC STABILITY: FOOD INSECURITY: WITHIN THE PAST 12 MONTHS, THE FOOD YOU BOUGHT JUST DIDN'T LAST AND YOU DIDN'T HAVE MONEY TO GET MORE.: NEVER TRUE

## 2024-01-23 ASSESSMENT — ANXIETY QUESTIONNAIRES
3. WORRYING TOO MUCH ABOUT DIFFERENT THINGS: 0
7. FEELING AFRAID AS IF SOMETHING AWFUL MIGHT HAPPEN: 0
4. TROUBLE RELAXING: 0
5. BEING SO RESTLESS THAT IT IS HARD TO SIT STILL: 0
IF YOU CHECKED OFF ANY PROBLEMS ON THIS QUESTIONNAIRE, HOW DIFFICULT HAVE THESE PROBLEMS MADE IT FOR YOU TO DO YOUR WORK, TAKE CARE OF THINGS AT HOME, OR GET ALONG WITH OTHER PEOPLE: NOT DIFFICULT AT ALL
6. BECOMING EASILY ANNOYED OR IRRITABLE: 0
2. NOT BEING ABLE TO STOP OR CONTROL WORRYING: 0
GAD7 TOTAL SCORE: 0
1. FEELING NERVOUS, ANXIOUS, OR ON EDGE: 0

## 2024-01-23 ASSESSMENT — PATIENT HEALTH QUESTIONNAIRE - PHQ9
SUM OF ALL RESPONSES TO PHQ QUESTIONS 1-9: 0
2. FEELING DOWN, DEPRESSED OR HOPELESS: 0
SUM OF ALL RESPONSES TO PHQ QUESTIONS 1-9: 0
SUM OF ALL RESPONSES TO PHQ QUESTIONS 1-9: 0
1. LITTLE INTEREST OR PLEASURE IN DOING THINGS: 0
SUM OF ALL RESPONSES TO PHQ9 QUESTIONS 1 & 2: 0
SUM OF ALL RESPONSES TO PHQ QUESTIONS 1-9: 0

## 2024-01-23 NOTE — PROGRESS NOTES
place, and time.      Cranial Nerves: No cranial nerve deficit.   Psychiatric:         Behavior: Behavior normal.         Thought Content: Thought content normal.         Judgment: Judgment normal.       /74 (Site: Left Upper Arm, Position: Sitting, Cuff Size: Medium Adult)   Ht 1.626 m (5' 4\")   Wt 70.4 kg (155 lb 3.2 oz)   LMP 01/20/2024 (Approximate)   Breastfeeding No   BMI 26.64 kg/m²     Assessment:          Diagnosis Orders   1. Breast skin changes  US BREAST COMPLETE RIGHT    [facility] DI[facility]NOSTIC W OR WO CAD BILATERAL      2. Breast pain, left  US BREAST COMPLETE RIGHT    [facility] DIGITAL DIAGNOSTIC W OR WO CAD BILATERAL      3. Family history of malignant neoplasm of breast in relative diagnosed when younger than 45 years of age  US BREAST COMPLETE RIGHT    [facility] DIGITAL DIAGNOSTIC W OR WO CAD BILATERAL          Plan:        Breast lump/mass/pain-  Exam showed: Scaled skin noted with hyperpigmented pinkish area under, with localized swelling, no  increased warmth or drainage.   She is BF but rarely    Discussed possible causes but not limited to- infection, benign lumps, cancerous lumps, etc.  Recommend diagnostic mammogram bilaterally and right breast ultrasound,  which is ordered and she is agreeable, although discussed with breastfeeding they may only complete breast ultrasound.   Monitor any increase size pain, if fevers, chills or redness or warmth to area notify us.   Will notify her of imaging results when available.   If negative could consider topical treatment    Continue self breast exams.  Pt agreeable plan of care       Return if symptoms worsen or fail to improve, for annual exam.       Orders Placed This Encounter   Procedures    US BREAST COMPLETE RIGHT     Standing Status:   Future     Standing Expiration Date:   1/23/2025     Order Specific Question:   Reason for exam:     Answer:   right breast changes    [facility] DIGITAL DIAGNOSTIC W OR WO CAD BILATERAL     Standing Status:   Future

## 2024-11-26 NOTE — TELEPHONE ENCOUNTER
Will call patient to see about getting her scheduled with provider. This patient has been assessed with a concern for Malnutrition and was treated during this hospitalization for the following Nutrition diagnosis/diagnoses:     -  11/24/2024: Moderate protein-calorie malnutrition